# Patient Record
Sex: FEMALE | Race: WHITE | NOT HISPANIC OR LATINO | Employment: FULL TIME | URBAN - METROPOLITAN AREA
[De-identification: names, ages, dates, MRNs, and addresses within clinical notes are randomized per-mention and may not be internally consistent; named-entity substitution may affect disease eponyms.]

---
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
## 2019-03-05 ENCOUNTER — OFFICE VISIT (OUTPATIENT)
Dept: URGENT CARE | Facility: PHYSICIAN GROUP | Age: 19
End: 2019-03-05
Payer: COMMERCIAL

## 2019-03-05 VITALS
OXYGEN SATURATION: 98 % | SYSTOLIC BLOOD PRESSURE: 114 MMHG | BODY MASS INDEX: 21.98 KG/M2 | RESPIRATION RATE: 16 BRPM | TEMPERATURE: 98.6 F | HEIGHT: 68 IN | HEART RATE: 107 BPM | WEIGHT: 145 LBS | DIASTOLIC BLOOD PRESSURE: 70 MMHG

## 2019-03-05 DIAGNOSIS — J11.1 INFLUENZA-LIKE ILLNESS: ICD-10-CM

## 2019-03-05 LAB
FLUAV+FLUBV AG SPEC QL IA: NORMAL
INT CON NEG: NEGATIVE
INT CON NEG: NEGATIVE
INT CON POS: POSITIVE
INT CON POS: POSITIVE
S PYO AG THROAT QL: NORMAL

## 2019-03-05 PROCEDURE — 87804 INFLUENZA ASSAY W/OPTIC: CPT | Performed by: PHYSICIAN ASSISTANT

## 2019-03-05 PROCEDURE — 99214 OFFICE O/P EST MOD 30 MIN: CPT | Performed by: PHYSICIAN ASSISTANT

## 2019-03-05 PROCEDURE — 87880 STREP A ASSAY W/OPTIC: CPT | Performed by: PHYSICIAN ASSISTANT

## 2019-03-05 RX ORDER — OSELTAMIVIR PHOSPHATE 75 MG/1
75 CAPSULE ORAL 2 TIMES DAILY
Qty: 10 CAP | Refills: 0 | Status: SHIPPED | OUTPATIENT
Start: 2019-03-05 | End: 2019-06-26

## 2019-03-05 NOTE — LETTER
March 5, 2019         Patient: Chyna Wade   YOB: 2000   Date of Visit: 3/5/2019           To Whom it May Concern:    Chyna Wade was seen in my clinic on 3/5/2019.   Please excuse her from missed work 03/05/19-03/07/19    If you have any questions or concerns, please don't hesitate to call.        Sincerely,           Laurie Laurent P.A.-C.  Electronically Signed

## 2019-03-05 NOTE — PROGRESS NOTES
Chief Complaint   Patient presents with   • Sore Throat     low grade fever, X last night        HISTORY OF PRESENT ILLNESS: Patient is a 18 y.o. female who presents today for about 24 hours of worsening aches, fevers, sore throat/coughing. Coughing started today. Diffuse body aches.  Took Ibuprofen 2 hours ago, fever was at 100.4.  Mild improvement of symptoms. Cough is mostly dry.  No nausea, vomiting.  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     There are no active problems to display for this patient.      Allergies:Sulfa drugs    Current Outpatient Prescriptions Ordered in [facility]   Medication Sig Dispense Refill   • oseltamivir (TAMIFLU) 75 MG Cap Take 1 Cap by mouth 2 times a day. 10 Cap 0   • ibuprofen (MOTRIN) 800 MG Tab Take 1 Tab by mouth every 8 hours as needed. 60 Tab 0   • buPROPion (WELLBUTRIN XL) 150 MG XL tablet      • albuterol 108 (90 Base) MCG/ACT Aero Soln inhalation aerosol Inhale 2 Puffs by mouth every four hours as needed for Shortness of Breath. 1 Inhaler 0   • lithium CR (LITHOBID) 300 MG Tab CR Take 300 mg by mouth every evening.     • lamotrigine (LAMICTAL) 150 MG tablet Take 300 mg by mouth every morning.     • Montelukast Sodium (SINGULAIR PO) Take 1 Tab by mouth every morning.       No current [facility]-ordered facility-administered medications on file.        Past Medical History:   Diagnosis Date   • Allergy    • Psychiatric disorder     depression, bipolar       Social History   Substance Use Topics   • Smoking status: Never Smoker   • Smokeless tobacco: Never Used   • Alcohol use No       Family Status   Relation Status   • Mo Alive   • Fa Alive   History reviewed. No pertinent family history.    ROS:  Review of Systems   Constitutional: SEE HPI  HENT: SEE HPI  Eyes: Negative for blurred vision.   Respiratory: SEE HPI  Cardiovascular: Negative for chest pain, palpitations, orthopnea and leg swelling.   Gastrointestinal: Negative for heartburn, nausea, vomiting and  "abdominal pain.       Exam:  Blood pressure 114/70, pulse (!) 107, temperature 37 °C (98.6 °F), resp. rate 16, height 1.727 m (5' 8\"), weight 65.8 kg (145 lb), SpO2 98 %, not currently breastfeeding.  General:  Well nourished, well developed female in NAD, mildly unwell appearing.   Eyes: PERRLA, EOM within normal limits, no conjunctival injection, no scleral icterus, visual fields and acuity grossly intact.  Ears: Normal shape and symmetry, no tenderness, no discharge. External canals are without any significant edema or erythema. Tympanic membranes are without any inflammation, no effusion. Gross auditory acuity is intact  Nose: Symmetrical, sinuses without tenderness, clear rhinorrhea.   Mouth: reasonable hygiene, moderate posterior erythema without exudates or tonsillar enlargement.  Neck: no masses, range of motion within normal limits, no tracheal deviation. No lymphadenopathy  Pulmonary: Normal respiratory effort, no wheezes, crackles, or rhonchi.  Cardiovascular: Mildly tachy with reg rhythm without murmurs, rubs, or gallops.  Skin: No visible rashes or lesion. Warm, pink, dry.   Extremities: no clubbing, cyanosis, or edema.  Neuro: A&O x 3. Speech normal/clear.  Normal gait.         Assessment/Plan:  1. Influenza-like illness  oseltamivir (TAMIFLU) 75 MG Cap    POCT Influenza A/B    POCT Rapid Strep A          -strep negative.    -influenza NEG however presenting with influenza like illness.  Lungs CTA.   -fluids emphasized. Alternating Tylenol/Motrin prn pain/inflammation/fever  -rest and supportive care emphasized, Tamiflu rx.   -RTC precautions discussed such as worsening despite treatment or new symptoms/new fevers, etc.   -work note provided    Supportive care, differential diagnoses, and indications for immediate follow-up discussed with patient.   Pathogenesis of diagnosis discussed including typical length and natural progression.   Instructed to return to clinic or nearest emergency department for " any change in condition, further concerns, or worsening of symptoms.  Patient states understanding of the plan of care and discharge instructions.        Laurie Laurent P.A.-C.

## 2019-03-07 ENCOUNTER — OFFICE VISIT (OUTPATIENT)
Dept: URGENT CARE | Facility: PHYSICIAN GROUP | Age: 19
End: 2019-03-07
Payer: COMMERCIAL

## 2019-03-07 VITALS
TEMPERATURE: 98.6 F | HEART RATE: 74 BPM | HEIGHT: 68 IN | DIASTOLIC BLOOD PRESSURE: 70 MMHG | RESPIRATION RATE: 16 BRPM | OXYGEN SATURATION: 98 % | SYSTOLIC BLOOD PRESSURE: 118 MMHG | BODY MASS INDEX: 21.52 KG/M2 | WEIGHT: 142 LBS

## 2019-03-07 DIAGNOSIS — J06.0 SORE THROAT AND LARYNGITIS: ICD-10-CM

## 2019-03-07 DIAGNOSIS — J45.20 MILD INTERMITTENT ASTHMA WITHOUT COMPLICATION: ICD-10-CM

## 2019-03-07 DIAGNOSIS — H65.192 OTHER ACUTE NONSUPPURATIVE OTITIS MEDIA OF LEFT EAR, RECURRENCE NOT SPECIFIED: ICD-10-CM

## 2019-03-07 PROCEDURE — 99213 OFFICE O/P EST LOW 20 MIN: CPT | Performed by: PHYSICIAN ASSISTANT

## 2019-03-07 RX ORDER — AMOXICILLIN 500 MG/1
500 CAPSULE ORAL 2 TIMES DAILY
Qty: 20 CAP | Refills: 0 | Status: SHIPPED | OUTPATIENT
Start: 2019-03-07 | End: 2019-03-17

## 2019-03-07 RX ORDER — ALBUTEROL SULFATE 90 UG/1
2 AEROSOL, METERED RESPIRATORY (INHALATION) EVERY 6 HOURS PRN
Qty: 8.5 G | Refills: 0 | Status: SHIPPED | OUTPATIENT
Start: 2019-03-07 | End: 2020-03-06

## 2019-03-07 ASSESSMENT — ENCOUNTER SYMPTOMS
MYALGIAS: 1
CHILLS: 1
VOMITING: 0
FEVER: 1
SORE THROAT: 1
HEADACHES: 1
NAUSEA: 0
EYE PAIN: 0
SHORTNESS OF BREATH: 1
EYE DISCHARGE: 0
COUGH: 1

## 2019-03-07 NOTE — PROGRESS NOTES
Subjective:      Chyna Wade is a 18 y.o. female who presents with Laryngitis (was seen on 3/5 strep and flu Neg, now throwing up, sinus & chest pressure) and Sore Throat (cant eat or drink )            Pharyngitis    This is a new problem. The current episode started in the past 7 days. The problem has been gradually worsening. Maximum temperature: subjective fever. The pain is moderate. Associated symptoms include congestion, coughing, ear pain, headaches and shortness of breath. Pertinent negatives include no abdominal pain, ear discharge, trouble swallowing or vomiting. She has tried NSAIDs for the symptoms. The treatment provided mild relief.        Patient returns to urgent care today with worsening sore throat.  She was seen 2 days ago and diagnosed with influenza.  She was prescribed Tamiflu at that time.  Patient states her sore throat has gradually worsened. She is experiencing pain with swallowing, and has lost her voice.  Patient also reports bilateral ear pain, left greater than right, and possible fever. She has taken OTC Motrin for her symptoms with mild relief.  Patient states she has a slight headache.  She is also experiencing mild shortness of breath related to her asthma.  She denies nausea/vomiting, nasal/congestion and chest pain.      PMH:  has a past medical history of Allergy and Psychiatric disorder. She also has no past medical history of ASTHMA or Diabetes.  MEDS:   Current Outpatient Prescriptions:   •  amoxicillin (AMOXIL) 500 MG Cap, Take 1 Cap by mouth 2 times a day for 10 days., Disp: 20 Cap, Rfl: 0  •  albuterol 108 (90 Base) MCG/ACT Aero Soln inhalation aerosol, Inhale 2 Puffs by mouth every 6 hours as needed for Shortness of Breath., Disp: 8.5 g, Rfl: 0  •  oseltamivir (TAMIFLU) 75 MG Cap, Take 1 Cap by mouth 2 times a day. (Patient not taking: Reported on 3/7/2019), Disp: 10 Cap, Rfl: 0  •  ibuprofen (MOTRIN) 800 MG Tab, Take 1 Tab by mouth every 8 hours as needed.,  "Disp: 60 Tab, Rfl: 0  •  buPROPion (WELLBUTRIN XL) 150 MG XL tablet, , Disp: , Rfl:   •  albuterol 108 (90 Base) MCG/ACT Aero Soln inhalation aerosol, Inhale 2 Puffs by mouth every four hours as needed for Shortness of Breath., Disp: 1 Inhaler, Rfl: 0  •  lithium CR (LITHOBID) 300 MG Tab CR, Take 300 mg by mouth every evening., Disp: , Rfl:   •  lamotrigine (LAMICTAL) 150 MG tablet, Take 300 mg by mouth every morning., Disp: , Rfl:   •  Montelukast Sodium (SINGULAIR PO), Take 1 Tab by mouth every morning., Disp: , Rfl:   ALLERGIES:   Allergies   Allergen Reactions   • Sulfa Drugs Diarrhea and Nausea     Sick for weeks     SURGHX:   Past Surgical History:   Procedure Laterality Date   • MYRINGOTOMY       SOCHX:  reports that she has never smoked. She has never used smokeless tobacco. She reports that she does not drink alcohol or use drugs.  FH: Family history was reviewed, no pertinent findings to report      Review of Systems   Constitutional: Positive for chills and fever.   HENT: Positive for congestion, ear pain and sore throat. Negative for ear discharge and trouble swallowing.    Eyes: Negative for pain and discharge.   Respiratory: Positive for cough and shortness of breath.    Gastrointestinal: Negative for abdominal pain, nausea and vomiting.   Genitourinary: Negative for dysuria, frequency and urgency.   Musculoskeletal: Positive for myalgias.   Neurological: Positive for headaches.          Objective:     /70   Pulse 74   Temp 37 °C (98.6 °F)   Resp 16   Ht 1.727 m (5' 8\")   Wt 64.4 kg (142 lb)   SpO2 98%   BMI 21.59 kg/m²      Physical Exam   Constitutional: She is oriented to person, place, and time. She appears well-developed and well-nourished. No distress.   HENT:   Head: Normocephalic and atraumatic.   Right Ear: Tympanic membrane, external ear and ear canal normal.   Left Ear: External ear normal. Tympanic membrane is erythematous and bulging.   Mouth/Throat: Posterior oropharyngeal " erythema present. Tonsils are 0 on the right. Tonsils are 0 on the left. No tonsillar exudate.   Cardiovascular: Normal rate and regular rhythm.    Pulmonary/Chest: Effort normal. She has wheezes.   Neurological: She is alert and oriented to person, place, and time.   Skin: Skin is warm and dry.                    Assessment/Plan:      1. Acute Otitis Media, Left  The patient's presenting symptoms and physical exam are consistent with an acute otitis media of her left ear. She is not experiencing any drainage from her left ear, indicating a tympanic membrane rupture is unlikely.   Plan:  Amoxicillin 500 mg PO BID x 10 days  OTC Tylenol or Motrin for fever/discomfort  Work excuse provided  Drink plenty of fluids  Vitamin C for immune health  Return to clinic or go to the ED if symptoms worsen or fail to improve, or if the patient should develop worsening/increasing ear pain, ear discharge, and/or severe fever.     2. Sore Throat (Viral)  The patient's sore throat is likely due to a viral illness given her presenting symptoms and physical exam. Additionally, she tested negative for Strep at her last visit. However, the antibiotic prescribed for her otiits media will also treat a strep infection of her throat.   Plan:   OTC Tylenol or Motrin for fever/discomfort  OTC Supportive Care - warm salt water gargles, sore throat lozenges, warm lemon water, and/or tea.  Work excuse provided  Drink plenty of fluids  Vitamin C for immune health  Return to clinic or go to the ED if symptoms worsen or fail to improve, or if patient develops severe fever, worsening sore throat, change in voice, difficulty handling secretions, and/or shortness of breath.      3. Mild intermittent Asthma   The patient has a history of mild intermittent asthma. She was previously prescribed an Albuterol inhaler to use during exercise. The patient is currently experiencing asthma symptoms in relation to her viral illness. Patient requesting a refill of  her Albuterol inhaler at this time.   Plan  Albuterol Inhaler 2 puffs Q6 hours prn shortness of breath  Return to clinic if symptoms worsen or fail to improve or if the patient should develop severe fever, worsening/increasing shortness of breath, worsening/increasing cough with sputum production, and/or chest pain     Discussed plans with patient, and she agreed to the above.

## 2019-03-07 NOTE — LETTER
[facility] [place]  [address]  [place] [address]     March 7, 2019    Patient: Chyna Wade   YOB: 2000   Date of Visit: 3/7/2019       To Whom It May Concern:    Chyna Wade was seen and treated in our department on 3/7/2019.  Please excuse her from work on 3/8.    Sincerely,       Meryl Mathias

## 2019-03-08 ASSESSMENT — ENCOUNTER SYMPTOMS
ABDOMINAL PAIN: 0
TROUBLE SWALLOWING: 0

## 2019-03-18 ENCOUNTER — APPOINTMENT (RX ONLY)
Dept: URBAN - METROPOLITAN AREA CLINIC 22 | Facility: CLINIC | Age: 19
Setting detail: DERMATOLOGY
End: 2019-03-18

## 2019-03-18 DIAGNOSIS — L81.4 OTHER MELANIN HYPERPIGMENTATION: ICD-10-CM

## 2019-03-18 DIAGNOSIS — D22 MELANOCYTIC NEVI: ICD-10-CM

## 2019-03-18 DIAGNOSIS — Z71.89 OTHER SPECIFIED COUNSELING: ICD-10-CM

## 2019-03-18 PROBLEM — D22.5 MELANOCYTIC NEVI OF TRUNK: Status: ACTIVE | Noted: 2019-03-18

## 2019-03-18 PROBLEM — D48.5 NEOPLASM OF UNCERTAIN BEHAVIOR OF SKIN: Status: ACTIVE | Noted: 2019-03-18

## 2019-03-18 PROCEDURE — 99203 OFFICE O/P NEW LOW 30 MIN: CPT | Mod: 25

## 2019-03-18 PROCEDURE — ? BIOPSY BY SHAVE METHOD

## 2019-03-18 PROCEDURE — 11102 TANGNTL BX SKIN SINGLE LES: CPT

## 2019-03-18 PROCEDURE — ? COUNSELING

## 2019-03-18 ASSESSMENT — LOCATION SIMPLE DESCRIPTION DERM
LOCATION SIMPLE: ABDOMEN
LOCATION SIMPLE: LEFT PLANTAR SURFACE

## 2019-03-18 ASSESSMENT — LOCATION ZONE DERM
LOCATION ZONE: FEET
LOCATION ZONE: TRUNK

## 2019-03-18 ASSESSMENT — LOCATION DETAILED DESCRIPTION DERM
LOCATION DETAILED: LEFT MEDIAL PLANTAR MIDFOOT
LOCATION DETAILED: EPIGASTRIC SKIN

## 2019-03-18 NOTE — PROCEDURE: BIOPSY BY SHAVE METHOD
Anesthesia Type: 1% lidocaine with 1:100,000 epinephrine and a 1:3 solution of 8.4% sodium bicarbonate
Additional Anesthesia Volume In Cc (Will Not Render If 0): 0
Lab Facility: 
Wound Care: Vaseline
Lab: [id number]
Consent: Written consent was obtained and risks were reviewed including but not limited to scarring, infection, bleeding, scabbing, incomplete removal, nerve damage and allergy to anesthesia.
Destruction After The Procedure: No
Cryotherapy Text: The wound bed was treated with cryotherapy after the biopsy was performed.
Depth Of Biopsy: dermis
Type Of Destruction Used: Curettage
Render Post-Care Instructions In Note?: yes
Electrodesiccation Text: The wound bed was treated with electrodesiccation after the biopsy was performed.
Dressing: bandage
Post-Care Instructions: I reviewed with the patient in detail post-care instructions. Patient is to keep the biopsy site dry overnight, and then apply bacitracin twice daily until healed. Patient may apply hydrogen peroxide soaks to remove any crusting.
Biopsy Type: H and E
Electrodesiccation And Curettage Text: The wound bed was treated with electrodesiccation and curettage after the biopsy was performed.
Billing Type: Third-Party Bill
Anesthesia Volume In Cc: 1
Notification Instructions: Patient will be notified of biopsy results. However, patient instructed to call the office if not contacted within 2 weeks.
Curettage Text: The wound bed was treated with curettage after the biopsy was performed.
Silver Nitrate Text: The wound bed was treated with silver nitrate after the biopsy was performed.
Hemostasis: Drysol
Detail Level: Detailed
Biopsy Method: Personna blade

## 2019-06-03 ENCOUNTER — TELEPHONE (OUTPATIENT)
Dept: CARDIOLOGY | Facility: MEDICAL CENTER | Age: 19
End: 2019-06-03

## 2019-06-04 NOTE — TELEPHONE ENCOUNTER
Called by Dr. Herrera at [facility] about this patient in the ER there with symptomatic tachycardia.  VSS at this time.  I discussed trial of metoprolol tartrate 25mg PO bid. Please reach out to her to schedule f/u asap with Dr. Ryder.  It seems this was cancelled for some reason last September, and it also seems she did not get a tilt table test as Dr. Ryder had ordered.      Dr. Ryder would you like her to still complete the tilt table? Maybe we can schedule it before you see her in follow-up.     Thanks!

## 2019-06-06 DIAGNOSIS — R00.2 PALPITATIONS: ICD-10-CM

## 2019-06-06 DIAGNOSIS — R42 DIZZINESS: ICD-10-CM

## 2019-06-06 DIAGNOSIS — R00.0 SYMPTOMATIC TACHYCARDIA: ICD-10-CM

## 2019-06-06 PROBLEM — R55 SYNCOPE AND COLLAPSE: Status: ACTIVE | Noted: 2019-06-06

## 2019-06-06 NOTE — TELEPHONE ENCOUNTER
I ordered the tilt table and am awaiting call back from Chyna.  Dr. Ryder would like the tilt table prior to the appointment.

## 2019-06-07 PROBLEM — R00.2 PALPITATIONS: Status: ACTIVE | Noted: 2019-06-07

## 2019-06-07 PROBLEM — R00.0 SYMPTOMATIC TACHYCARDIA: Status: ACTIVE | Noted: 2019-06-07

## 2019-06-07 PROBLEM — R42 DIZZINESS: Status: ACTIVE | Noted: 2019-06-07

## 2019-06-14 ENCOUNTER — HOSPITAL ENCOUNTER (OUTPATIENT)
Dept: CARDIOLOGY | Facility: MEDICAL CENTER | Age: 19
End: 2019-06-14
Attending: INTERNAL MEDICINE
Payer: COMMERCIAL

## 2019-06-14 DIAGNOSIS — R00.2 PALPITATIONS: ICD-10-CM

## 2019-06-14 DIAGNOSIS — R42 DIZZINESS: ICD-10-CM

## 2019-06-14 DIAGNOSIS — R00.0 SYMPTOMATIC TACHYCARDIA: ICD-10-CM

## 2019-06-14 PROCEDURE — A9270 NON-COVERED ITEM OR SERVICE: HCPCS

## 2019-06-14 PROCEDURE — 93660 TILT TABLE EVALUATION: CPT

## 2019-06-14 PROCEDURE — 700102 HCHG RX REV CODE 250 W/ 637 OVERRIDE(OP)

## 2019-06-14 RX ORDER — NITROGLYCERIN 0.4 MG/1
TABLET SUBLINGUAL
Status: COMPLETED
Start: 2019-06-14 | End: 2019-06-14

## 2019-06-14 RX ADMIN — NITROGLYCERIN 0.4 MG: 0.4 TABLET, ORALLY DISINTEGRATING SUBLINGUAL at 08:16

## 2019-06-14 NOTE — PROGRESS NOTES
"TILT TABLE:  Patient had NITRO TILT TABLE EXAM today.  Patient NPO for Exam, has  home. Patient was POSITIVE for passing out. Consent Signed. PIV initiated. Patient Given Verbal instructions/education regarding exam. Patient Vitals: HR 30s-100s, SBP unobtainable-120s.  Patient had: Projectile vomiting, dilated pupils, and diaphoresis. Patient  self-reported symptoms, nausea, vision loss, and \"not remembering throwing up\". Patient had decrease in heart rate, followed by pauses > 5 secs. Passive Tilt for 20 minutes followed by Nitroglycerin administration sublingual at 20 minute juanito with SBP of 114/75, followed. Patient event at min 26, table lowered and patient vomited, RN supported airway. Patient given 500cc NS bolus per protocol. Patient had 25 min recovery.  RN called Dr. Randall to come to bedside to review EKG tracings, symptoms, and patient condition. Tonia at bedside, and gave verbal instruction to patient and verbal order to discharge.  Patient stated “ I feel ready to go home”.  Patient given verbal discharge instructions per protocol. Patient PIV removed. Patient ambulated self to [facility], escorted by RN, met by  Her  to drive patient home.  , ADR, notified via phone message  And in person regarding EKG tracings and findings.  RN hand delivered EKG tracings to MD, per MD place in box to be read. RN placed EKG tracings and patient documents in box to be read, per MD request.   "

## 2019-06-26 ENCOUNTER — OFFICE VISIT (OUTPATIENT)
Dept: CARDIOLOGY | Facility: MEDICAL CENTER | Age: 19
End: 2019-06-26
Payer: COMMERCIAL

## 2019-06-26 ENCOUNTER — TELEPHONE (OUTPATIENT)
Dept: CARDIOLOGY | Facility: MEDICAL CENTER | Age: 19
End: 2019-06-26

## 2019-06-26 VITALS
SYSTOLIC BLOOD PRESSURE: 100 MMHG | WEIGHT: 149 LBS | OXYGEN SATURATION: 96 % | HEIGHT: 67 IN | DIASTOLIC BLOOD PRESSURE: 68 MMHG | BODY MASS INDEX: 23.39 KG/M2 | HEART RATE: 108 BPM

## 2019-06-26 DIAGNOSIS — R42 DIZZINESS: ICD-10-CM

## 2019-06-26 DIAGNOSIS — R55 SYNCOPE, UNSPECIFIED SYNCOPE TYPE: ICD-10-CM

## 2019-06-26 DIAGNOSIS — I45.5 SINUS PAUSE: ICD-10-CM

## 2019-06-26 PROCEDURE — 99215 OFFICE O/P EST HI 40 MIN: CPT | Performed by: INTERNAL MEDICINE

## 2019-06-26 RX ORDER — CARIPRAZINE 1.5 MG/1
1 CAPSULE, GELATIN COATED ORAL
Refills: 3 | COMMUNITY
Start: 2019-06-04 | End: 2020-03-06

## 2019-06-26 RX ORDER — BENZTROPINE MESYLATE 1 MG/1
TABLET ORAL
Refills: 2 | COMMUNITY
Start: 2019-06-03 | End: 2019-06-26

## 2019-06-26 RX ORDER — LEVONORGESTREL AND ETHINYL ESTRADIOL 150-30(84)
1 KIT ORAL
Refills: 3 | COMMUNITY
Start: 2019-04-10 | End: 2021-08-16

## 2019-06-26 ASSESSMENT — ENCOUNTER SYMPTOMS
ORTHOPNEA: 0
SHORTNESS OF BREATH: 0
ABDOMINAL PAIN: 0
DEPRESSION: 0
PND: 0
PALPITATIONS: 0
DIZZINESS: 1
LOSS OF CONSCIOUSNESS: 0
FALLS: 0

## 2019-06-26 NOTE — TELEPHONE ENCOUNTER
Called and spoke with patient. She will be out of town the later part of July. I offered her an appointment on 7-25 with Dr. Hunt. She will be out of town during this time. That is why the Zio was scheduled on 7-29-19. Patient scheduled to see Dr. Hunt on 8-27-19 so he will have the Zio results for this appointment.

## 2019-06-26 NOTE — PROGRESS NOTES
Chief Complaint   Patient presents with   • Palpitations   • Dizziness       Subjective:   Chyna Wade is a 18-year-old female presenting to clinic for follow-up on dizziness and palpitations.    Patient was last seen in our clinic about a year ago for the above symptoms and a tilt table was ordered but the patient was lost to follow-up.  Earlier this year, patient started having recurrent symptoms.  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  She reports bending over and cleaning the floor while the patient was in the bathroom.  She then remembers having tunnel vision and then remembers waking up while sitting on the floor.  Since then she has had multiple episodes of dizziness and tunnel vision that usually occur when she is standing up or bending over.  She was seen at [employer] [employer] [facility] in early June at which time she was started on low-dose metoprolol for persistent tachycardia.  She was subsequently referred for a tilt table study, detailed below.    She reports drinking about 30 to 36 ounces of water a day.  One caffeinated beverage a day.    Past Medical History:   Diagnosis Date   • Allergy    • Psychiatric disorder     depression, bipolar     Past Surgical History:   Procedure Laterality Date   • MYRINGOTOMY       History reviewed. No pertinent family history.     Social History     Social History   • Marital status: Single     Spouse name: N/A   • Number of children: N/A   • Years of education: N/A     Occupational History   • Not on file.     Social History Main Topics   • Smoking status: Never Smoker   • Smokeless tobacco: Never Used   • Alcohol use No   • Drug use: No   • Sexual activity: No     Other Topics Concern   • Not on file     Social History Narrative   • No narrative on file     Allergies   Allergen Reactions   • Sulfa Drugs Diarrhea and Nausea     Sick for weeks     Outpatient Encounter Prescriptions as of  "6/26/2019   Medication Sig Dispense Refill   • DAYSEE 0.15-0.03 &0.01 MG Tab Take 1 Tab by mouth every day.  3   • VRAYLAR 1.5 MG Cap Take 1 Cap by mouth every day.  3   • lamotrigine (LAMICTAL) 150 MG tablet Take 300 mg by mouth every morning.     • [DISCONTINUED] metoprolol (LOPRESSOR) 25 MG Tab TAKE 1 TABLET BY MOUTH TWICE A DAY  0   • [DISCONTINUED] benztropine (COGENTIN) 1 MG Tab TAKE 1 TABLET ORALLY DAILY IN THE MORNING  2   • albuterol 108 (90 Base) MCG/ACT Aero Soln inhalation aerosol Inhale 2 Puffs by mouth every 6 hours as needed for Shortness of Breath. 8.5 g 0   • [DISCONTINUED] oseltamivir (TAMIFLU) 75 MG Cap Take 1 Cap by mouth 2 times a day. (Patient not taking: Reported on 3/7/2019) 10 Cap 0   • ibuprofen (MOTRIN) 800 MG Tab Take 1 Tab by mouth every 8 hours as needed. 60 Tab 0   • [DISCONTINUED] buPROPion (WELLBUTRIN XL) 150 MG XL tablet      • albuterol 108 (90 Base) MCG/ACT Aero Soln inhalation aerosol Inhale 2 Puffs by mouth every four hours as needed for Shortness of Breath. 1 Inhaler 0   • [DISCONTINUED] lithium CR (LITHOBID) 300 MG Tab CR Take 300 mg by mouth every evening.     • [DISCONTINUED] Montelukast Sodium (SINGULAIR PO) Take 1 Tab by mouth every morning.       No facility-administered encounter medications on file as of 6/26/2019.      Review of Systems   Constitutional: Negative for malaise/fatigue.   Respiratory: Negative for shortness of breath.    Cardiovascular: Negative for chest pain, palpitations, orthopnea, leg swelling and PND.   Gastrointestinal: Negative for abdominal pain.   Musculoskeletal: Negative for falls.   Neurological: Positive for dizziness. Negative for loss of consciousness.   Psychiatric/Behavioral: Negative for depression.   All other systems reviewed and are negative.       Objective:   /62 (BP Location: Left arm, Patient Position: Sitting, BP Cuff Size: Adult)   Pulse 90   Ht 1.702 m (5' 7\")   Wt 67.6 kg (149 lb)   SpO2 95%   BMI 23.34 kg/m² "     Physical Exam   Constitutional: She is oriented to person, place, and time. She appears well-developed and well-nourished. No distress.   HENT:   Head: Normocephalic and atraumatic.   Eyes: Conjunctivae are normal. No scleral icterus.   Neck: Normal range of motion. Neck supple.   Cardiovascular: Normal rate, regular rhythm and normal heart sounds.  Exam reveals no gallop and no friction rub.    No murmur heard.  Pulmonary/Chest: Effort normal and breath sounds normal. No respiratory distress. She has no wheezes. She has no rales.   Abdominal: Soft. She exhibits no distension. There is no tenderness.   Musculoskeletal: She exhibits no edema.   Neurological: She is alert and oriented to person, place, and time.   Skin: Skin is warm and dry. She is not diaphoretic.   Psychiatric: She has a normal mood and affect. Her behavior is normal.   Nursing note and vitals reviewed.    Holter monitor performed in May 2018 showed sinus arrhythmia with an average heart rate of 79 bpm.  No sustained arrhythmias noted.  No symptoms reported.  Sinus tachycardia for approximately 7 hours in the 48 hour.    Echocardiogram performed in May 2018 showed normal LV systolic function.  No major valvular pathology noted.    Tilt table test performed June 2019 was personally reviewed and per my interpretation showed significant bradycardia and hypotension post nitroglycerin.  Patient had a 5-second pause as well.    Assessment:     1. Syncope, unspecified syncope type  REFERRAL TO CARDIAC ELECTROPHYSIOLOGY   2. Dizziness  REFERRAL TO CARDIAC ELECTROPHYSIOLOGY    Holter Monitor / Event Recorder   3. Sinus pause  REFERRAL TO CARDIAC ELECTROPHYSIOLOGY    Holter Monitor / Event Recorder       Medical Decision Making:  Today's Assessment / Status / Plan:     Palpitations:  Dizziness:  Sinus pause:  Patient developed hypotension and bradycardia during her tilt table study suggesting vasovagal syncope.  She also had a 5-second sinus pause during  her tilt table study.   I did orthostatic vitals on her today which were grossly positive.    Patient's pause during the tilt is concerning.  Based on my review of the literature, since this episode of pause occurred during a tilt table test showing vasovagal, she likely does not meet indication for permanent pacemaker.  Nonetheless I will refer her to electrophysiology to get their opinion regarding possible permanent pacing.  I have ordered a Ziopatch monitor to evaluate for any recurrent bradyarrhythmias or pauses.  If she does have significant arrhythmias on her ambulatory EKG monitor, she would meet indication for a permanent pacemaker.  She was on metoprolol at the time of return to the study.  I have discontinued her metoprolol for now.    In the meantime patient has been given written instructions to increase her fluid intake to at least 64 to 80 ounces of fluid a day and her salt intake to at least 6 g a day.  Lifestyle changes have been discussed including standing up slowly and to squat should she have symptoms.  She should minimize her caffeine intake.  She will return for repeat orthostatics in 2 weeks.    Total 40 minutes face-to-face time spent with patient, with greater than 50% of the total time discussing patient's issues and symptoms as listed above in assessment and plan, as well as managing coordination of care for future evaluation and treatment. Most of the time was spent discussing prior testing results and plan along with lifestyle modifications as discussed above.     Return to clinic in 4 months or earlier if needed.    Thank you for allowing me to participate in the care of this patient. Please do not hesitate to contact me with any questions.    Maria Ines Ryder MD  Cardiologist  [facility]      PLEASE NOTE: This dictation was created using voice recognition software.

## 2019-06-26 NOTE — LETTER
[facility]   [address] [place] [address]  [place] [address]  Phone: [phone]  Fax: [phone]              Chyna Bond  2000    Encounter Date: 6/26/2019    Maria Ines Ryder M.D.          PROGRESS NOTE:  Chief Complaint   Patient presents with   • Palpitations   • Dizziness       Subjective:   Chyna Wade is a 18-year-old female presenting to clinic for follow-up on dizziness and palpitations.    Patient was last seen in our clinic about a year ago for the above symptoms and a tilt table was ordered but the patient was lost to follow-up.  Earlier this year, patient started having recurrent symptoms.  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  She reports bending over and cleaning the floor while the patient was in the bathroom.  She then remembers having tunnel vision and then remembers waking up while sitting on the floor.  Since then she has had multiple episodes of dizziness and tunnel vision that usually occur when she is standing up or bending over.  She was seen at [facility] in early June at which time she was started on low-dose metoprolol for persistent tachycardia.  She was subsequently referred for a tilt table study, detailed below.    She reports drinking about 30 to 36 ounces of water a day.  One caffeinated beverage a day.    Past Medical History:   Diagnosis Date   • Allergy    • Psychiatric disorder     depression, bipolar     Past Surgical History:   Procedure Laterality Date   • MYRINGOTOMY       History reviewed. No pertinent family history.     Social History     Social History   • Marital status: Single     Spouse name: N/A   • Number of children: N/A   • Years of education: N/A     Occupational History   • Not on file.     Social History Main Topics   • Smoking status: Never Smoker   • Smokeless tobacco: Never Used   • Alcohol use No   •  Drug use: No   • Sexual activity: No     Other Topics Concern   • Not on file     Social History Narrative   • No narrative on file     Allergies   Allergen Reactions   • Sulfa Drugs Diarrhea and Nausea     Sick for weeks     Outpatient Encounter Prescriptions as of 6/26/2019   Medication Sig Dispense Refill   • DAYSEE 0.15-0.03 &0.01 MG Tab Take 1 Tab by mouth every day.  3   • VRAYLAR 1.5 MG Cap Take 1 Cap by mouth every day.  3   • lamotrigine (LAMICTAL) 150 MG tablet Take 300 mg by mouth every morning.     • [DISCONTINUED] metoprolol (LOPRESSOR) 25 MG Tab TAKE 1 TABLET BY MOUTH TWICE A DAY  0   • [DISCONTINUED] benztropine (COGENTIN) 1 MG Tab TAKE 1 TABLET ORALLY DAILY IN THE MORNING  2   • albuterol 108 (90 Base) MCG/ACT Aero Soln inhalation aerosol Inhale 2 Puffs by mouth every 6 hours as needed for Shortness of Breath. 8.5 g 0   • [DISCONTINUED] oseltamivir (TAMIFLU) 75 MG Cap Take 1 Cap by mouth 2 times a day. (Patient not taking: Reported on 3/7/2019) 10 Cap 0   • ibuprofen (MOTRIN) 800 MG Tab Take 1 Tab by mouth every 8 hours as needed. 60 Tab 0   • [DISCONTINUED] buPROPion (WELLBUTRIN XL) 150 MG XL tablet      • albuterol 108 (90 Base) MCG/ACT Aero Soln inhalation aerosol Inhale 2 Puffs by mouth every four hours as needed for Shortness of Breath. 1 Inhaler 0   • [DISCONTINUED] lithium CR (LITHOBID) 300 MG Tab CR Take 300 mg by mouth every evening.     • [DISCONTINUED] Montelukast Sodium (SINGULAIR PO) Take 1 Tab by mouth every morning.       No facility-administered encounter medications on file as of 6/26/2019.      Review of Systems   Constitutional: Negative for malaise/fatigue.   Respiratory: Negative for shortness of breath.    Cardiovascular: Negative for chest pain, palpitations, orthopnea, leg swelling and PND.   Gastrointestinal: Negative for abdominal pain.   Musculoskeletal: Negative for falls.   Neurological: Positive for dizziness. Negative for loss of consciousness.   Psychiatric/Behavioral:  "Negative for depression.   All other systems reviewed and are negative.       Objective:   /62 (BP Location: Left arm, Patient Position: Sitting, BP Cuff Size: Adult)   Pulse 90   Ht 1.702 m (5' 7\")   Wt 67.6 kg (149 lb)   SpO2 95%   BMI 23.34 kg/m²      Physical Exam   Constitutional: She is oriented to person, place, and time. She appears well-developed and well-nourished. No distress.   HENT:   Head: Normocephalic and atraumatic.   Eyes: Conjunctivae are normal. No scleral icterus.   Neck: Normal range of motion. Neck supple.   Cardiovascular: Normal rate, regular rhythm and normal heart sounds.  Exam reveals no gallop and no friction rub.    No murmur heard.  Pulmonary/Chest: Effort normal and breath sounds normal. No respiratory distress. She has no wheezes. She has no rales.   Abdominal: Soft. She exhibits no distension. There is no tenderness.   Musculoskeletal: She exhibits no edema.   Neurological: She is alert and oriented to person, place, and time.   Skin: Skin is warm and dry. She is not diaphoretic.   Psychiatric: She has a normal mood and affect. Her behavior is normal.   Nursing note and vitals reviewed.    Holter monitor performed in May 2018 showed sinus arrhythmia with an average heart rate of 79 bpm.  No sustained arrhythmias noted.  No symptoms reported.  Sinus tachycardia for approximately 7 hours in the 48 hour.    Echocardiogram performed in May 2018 showed normal LV systolic function.  No major valvular pathology noted.    Tilt table test performed June 2019 was personally reviewed and per my interpretation showed significant bradycardia and hypotension post nitroglycerin.  Patient had a 5-second pause as well.    Assessment:     1. Syncope, unspecified syncope type  REFERRAL TO CARDIAC ELECTROPHYSIOLOGY   2. Dizziness  REFERRAL TO CARDIAC ELECTROPHYSIOLOGY    Holter Monitor / Event Recorder   3. Sinus pause  REFERRAL TO CARDIAC ELECTROPHYSIOLOGY    Holter Monitor / Event Recorder "       Medical Decision Making:  Today's Assessment / Status / Plan:     Palpitations:  Dizziness:  Sinus pause:  Patient developed hypotension and bradycardia during her tilt table study suggesting vasovagal syncope.  She also had a 5-second sinus pause during her tilt table study.   I did orthostatic vitals on her today which were grossly positive.    Patient's pause during the tilt is concerning.  Based on my review of the literature, since this episode of pause occurred during a tilt table test showing vasovagal, she likely does not meet indication for permanent pacemaker.  Nonetheless I will refer her to electrophysiology to get their opinion regarding possible permanent pacing.  I have ordered a Ziopatch monitor to evaluate for any recurrent bradyarrhythmias or pauses.  If she does have significant arrhythmias on her ambulatory EKG monitor, she would meet indication for a permanent pacemaker.  She was on metoprolol at the time of return to the study.  I have discontinued her metoprolol for now.    In the meantime patient has been given written instructions to increase her fluid intake to at least 64 to 80 ounces of fluid a day and her salt intake to at least 6 g a day.  Lifestyle changes have been discussed including standing up slowly and to squat should she have symptoms.  She should minimize her caffeine intake.  She will return for repeat orthostatics in 2 weeks.    Total 40 minutes face-to-face time spent with patient, with greater than 50% of the total time discussing patient's issues and symptoms as listed above in assessment and plan, as well as managing coordination of care for future evaluation and treatment. Most of the time was spent discussing prior testing results and plan along with lifestyle modifications as discussed above.     Return to clinic in 4 months or earlier if needed.    Thank you for allowing me to participate in the care of this patient. Please do not hesitate to contact me with any  questions.    Maria Ines Ryder MD  Cardiologist  [facility]      PLEASE NOTE: This dictation was created using voice recognition software.               Linda Collazo M.D.  [address] [address]  VIA Facsimile: [phone]

## 2019-06-26 NOTE — PATIENT INSTRUCTIONS
Please increase her fluid intake.  He should be drinking at least 64 to 80 ounces of fluid a day, if not more.  Please minimize your caffeine intake.  Please increase your salt intake.  You should be getting at least 6 g of salt a day, if not more.  Please stop taking metoprolol.  Remember to stand up slowly and to squat if you have symptoms.

## 2019-06-26 NOTE — TELEPHONE ENCOUNTER
----- Message from Tonny Davis R.N. sent at 6/26/2019  1:28 PM PDT -----  Dr. Ryder put in an EP consult for this pt for next available.

## 2019-07-29 ENCOUNTER — TELEPHONE (OUTPATIENT)
Dept: CARDIOLOGY | Facility: MEDICAL CENTER | Age: 19
End: 2019-07-29

## 2019-07-29 ENCOUNTER — NON-PROVIDER VISIT (OUTPATIENT)
Dept: CARDIOLOGY | Facility: MEDICAL CENTER | Age: 19
End: 2019-07-29
Payer: COMMERCIAL

## 2019-07-29 DIAGNOSIS — R42 DIZZINESS: ICD-10-CM

## 2019-07-29 DIAGNOSIS — R00.0 SINUS TACHYCARDIA: ICD-10-CM

## 2019-07-29 DIAGNOSIS — I45.5 SINUS PAUSE: ICD-10-CM

## 2019-07-29 NOTE — PROCEDURES
"The patient is a 18-year-old female with past medical history of syncope.     TECHNIQUE: Risks and benefits explained to the patient. Consent obtained.  Patient Vitals: HR 30s-100s, SBP unobtainable-120s.  Patient had: Projectile vomiting, dilated pupils, and diaphoresis. Patient  self-reported symptoms, nausea, vision loss, and \"not remembering throwing up\". Patient had decrease in heart rate, followed by pauses > 5 secs. Passive Tilt for 20 minutes followed by Nitroglycerin administration sublingual at 20 minute juanito with SBP of 114/75, followed. Patient event at min 26, table lowered and patient vomited, RN supported airway. Patient given 500cc NS bolus per protocol. Patient had 25 min recovery.  R    COMPLICATIONS: None.    Tilt table was then terminated.    SUMMARY: Positive tilt table for vasovagal syncope with significant increase of heart rate with decrease of blood pressure.    RECOMMENDATIONS: I recommend followup in the office in one week.      "

## 2019-08-07 ENCOUNTER — EMPLOYEE HEALTH (OUTPATIENT)
Dept: OCCUPATIONAL MEDICINE | Facility: CLINIC | Age: 19
End: 2019-08-07

## 2019-08-07 ENCOUNTER — EH NON-PROVIDER (OUTPATIENT)
Dept: OCCUPATIONAL MEDICINE | Facility: CLINIC | Age: 19
End: 2019-08-07

## 2019-08-07 ENCOUNTER — HOSPITAL ENCOUNTER (OUTPATIENT)
Facility: MEDICAL CENTER | Age: 19
End: 2019-08-07
Attending: PREVENTIVE MEDICINE
Payer: COMMERCIAL

## 2019-08-07 VITALS
HEIGHT: 67 IN | WEIGHT: 166 LBS | BODY MASS INDEX: 26.06 KG/M2 | TEMPERATURE: 98 F | RESPIRATION RATE: 16 BRPM | HEART RATE: 100 BPM | DIASTOLIC BLOOD PRESSURE: 96 MMHG | SYSTOLIC BLOOD PRESSURE: 132 MMHG | OXYGEN SATURATION: 97 %

## 2019-08-07 DIAGNOSIS — Z02.1 PRE-EMPLOYMENT DRUG SCREENING: ICD-10-CM

## 2019-08-07 DIAGNOSIS — Z02.1 PRE-EMPLOYMENT HEALTH SCREENING EXAMINATION: ICD-10-CM

## 2019-08-07 DIAGNOSIS — Z02.89 ENCOUNTER FOR OCCUPATIONAL HEALTH ASSESSMENT: ICD-10-CM

## 2019-08-07 PROCEDURE — 8915 PR COMPREHENSIVE PHYSICAL: Performed by: NURSE PRACTITIONER

## 2019-08-07 PROCEDURE — 94010 BREATHING CAPACITY TEST: CPT | Performed by: PREVENTIVE MEDICINE

## 2019-08-07 PROCEDURE — 94375 RESPIRATORY FLOW VOLUME LOOP: CPT | Performed by: PREVENTIVE MEDICINE

## 2019-08-07 PROCEDURE — 80305 DRUG TEST PRSMV DIR OPT OBS: CPT | Performed by: PREVENTIVE MEDICINE

## 2019-08-07 PROCEDURE — 86480 TB TEST CELL IMMUN MEASURE: CPT | Performed by: PREVENTIVE MEDICINE

## 2019-08-07 NOTE — PROGRESS NOTES
Preemployment physical completed.  See scanned document.  Patient wearing Holter monitor during visit.  States is going to be removed soon.  No adverse effects at this time.

## 2019-08-08 ENCOUNTER — OFFICE VISIT (OUTPATIENT)
Dept: URGENT CARE | Facility: PHYSICIAN GROUP | Age: 19
End: 2019-08-08
Payer: COMMERCIAL

## 2019-08-08 VITALS
WEIGHT: 161 LBS | TEMPERATURE: 98.6 F | RESPIRATION RATE: 16 BRPM | HEIGHT: 67 IN | DIASTOLIC BLOOD PRESSURE: 72 MMHG | BODY MASS INDEX: 25.27 KG/M2 | SYSTOLIC BLOOD PRESSURE: 108 MMHG | OXYGEN SATURATION: 98 % | HEART RATE: 84 BPM

## 2019-08-08 DIAGNOSIS — K52.9 AGE (ACUTE GASTROENTERITIS): ICD-10-CM

## 2019-08-08 LAB
INT CON NEG: NEGATIVE
INT CON POS: POSITIVE
POC URINE PREGNANCY TEST: NORMAL

## 2019-08-08 PROCEDURE — 81025 URINE PREGNANCY TEST: CPT | Performed by: PHYSICIAN ASSISTANT

## 2019-08-08 PROCEDURE — 99214 OFFICE O/P EST MOD 30 MIN: CPT | Performed by: PHYSICIAN ASSISTANT

## 2019-08-08 RX ORDER — ONDANSETRON 4 MG/1
4 TABLET, ORALLY DISINTEGRATING ORAL EVERY 8 HOURS PRN
Qty: 10 TAB | Refills: 0 | Status: SHIPPED | OUTPATIENT
Start: 2019-08-08 | End: 2019-08-27

## 2019-08-08 RX ORDER — ONDANSETRON 4 MG/1
4 TABLET, ORALLY DISINTEGRATING ORAL ONCE
Status: COMPLETED | OUTPATIENT
Start: 2019-08-08 | End: 2019-08-08

## 2019-08-08 RX ADMIN — ONDANSETRON 4 MG: 4 TABLET, ORALLY DISINTEGRATING ORAL at 12:23

## 2019-08-08 NOTE — PROGRESS NOTES
Chief Complaint   Patient presents with   • Emesis     Diarrhea x 2 days.  Vomitting since this am.       HISTORY OF PRESENT ILLNESS: Patient is a 18 y.o. female who presents today for about 2 days of not improving diarrhea and vomiting and starting today.   She is having waxing and waning abdominal cramping without focal pain.  No fevers or chills.   No other sick contacts.  No recent foreign travel or abx.  She did try some Imodium without relief.      She states she is most concerned because she has hx of orthostatic hypotension and symptomatic tachycardia, has been told by her Cardiologist to maintain adequate sodium intake and she has been unable to do with her illness.    LMP (current)    Patient Active Problem List    Diagnosis Date Noted   • Symptomatic tachycardia 06/07/2019   • Dizziness 06/07/2019   • Palpitations 06/07/2019       Allergies:Nitroglycerin and Sulfa drugs    Current Outpatient Medications Ordered in Epic   Medication Sig Dispense Refill   • Montelukast Sodium (SINGULAIR PO) Take  by mouth.     • DAYSEE 0.15-0.03 &0.01 MG Tab Take 1 Tab by mouth every day.  3   • VRAYLAR 1.5 MG Cap Take 1 Cap by mouth every day.  3   • lamotrigine (LAMICTAL) 150 MG tablet Take 300 mg by mouth every morning.     • albuterol 108 (90 Base) MCG/ACT Aero Soln inhalation aerosol Inhale 2 Puffs by mouth every 6 hours as needed for Shortness of Breath. 8.5 g 0   • ibuprofen (MOTRIN) 800 MG Tab Take 1 Tab by mouth every 8 hours as needed. 60 Tab 0   • albuterol 108 (90 Base) MCG/ACT Aero Soln inhalation aerosol Inhale 2 Puffs by mouth every four hours as needed for Shortness of Breath. 1 Inhaler 0     No current Epic-ordered facility-administered medications on file.        Past Medical History:   Diagnosis Date   • Allergy    • Psychiatric disorder     depression, bipolar       Social History     Tobacco Use   • Smoking status: Never Smoker   • Smokeless tobacco: Never Used   Substance Use Topics   • Alcohol use: No  "  • Drug use: No       Family Status   Relation Name Status   • Mo  Alive   • Fa  Alive   No family history on file.    ROS:  Review of Systems   Constitutional: SEE HPI  HENT: Negative for ear pain, nosebleeds, congestion, sore throat and neck pain.    Eyes: Negative for blurred vision.   Respiratory: Negative for cough, sputum production, shortness of breath and wheezing.    Cardiovascular: Negative for chest pain, palpitations, orthopnea and leg swelling.   Gastrointestinal:SEE HPI  Genitourinary: Negative for dysuria, urgency and frequency.     Exam:  /72   Pulse 84   Temp 37 °C (98.6 °F) (Temporal)   Resp 16   Ht 1.702 m (5' 7\")   Wt 73 kg (161 lb)   SpO2 98%   General:  Well nourished, well developed female in NAD  Eyes: PERRLA, EOM within normal limits, no conjunctival injection, no scleral icterus, visual fields and acuity grossly intact.  Mouth: reasonable hygiene, no erythema exudates or tonsillar enlargement.  Neck: no masses, range of motion within normal limits, no tracheal deviation. No lymphadenopathy  Pulmonary: Normal respiratory effort, no wheezes, crackles, or rhonchi.  Cardiovascular: regular rate and rhythm without murmurs, rubs, or gallops.  Abdomen: Soft, mild diffuse TTP, nondistended. Normal bowel sounds. No hepatosplenomegaly or masses, or hernias. No rebound or guarding.    Skin: No visible rashes or lesion. Warm, pink, dry.   Extremities: no clubbing, cyanosis, or edema.  Neuro: A&O x 3. Speech normal/clear.  Normal gait.         Assessment/Plan:  1. AGE (acute gastroenteritis)  ondansetron (ZOFRAN ODT) dispertab 4 mg    ondansetron (ZOFRAN ODT) 4 MG TABLET DISPERSIBLE    POCT Pregnancy         -consistent with self limited viral gastroenteritis.   -benign abdominal exam, no evidence of acute abdomen  -zofran tablet given in clinic, additional sent in.   -electrolyte rehydration, advance bland diet as tolerated.   -abdominal pain red flags discussed, ER precautions. "       Supportive care, differential diagnoses, and indications for immediate follow-up discussed with patient.   Pathogenesis of diagnosis discussed including typical length and natural progression.   Instructed to return to clinic or nearest emergency department for any change in condition, further concerns, or worsening of symptoms.  Patient states understanding of the plan of care and discharge instructions.        Laurie Laurent P.A.-C.

## 2019-08-08 NOTE — LETTER
August 8, 2019         Patient: Chyna Bond   YOB: 2000   Date of Visit: 8/8/2019           To Whom it May Concern:    Chyna Bond was seen in my clinic on 8/8/2019. Please excuse her from work tonight.     If you have any questions or concerns, please don't hesitate to call.        Sincerely,           Laurie Laurent P.A.-C.  Electronically Signed

## 2019-08-09 LAB
GAMMA INTERFERON BACKGROUND BLD IA-ACNC: 0.02 IU/ML
M TB IFN-G BLD-IMP: NEGATIVE
M TB IFN-G CD4+ BCKGRND COR BLD-ACNC: 0 IU/ML
MITOGEN IGNF BCKGRD COR BLD-ACNC: >10 IU/ML
QFT TB2 - NIL TBQ2: 0 IU/ML

## 2019-08-12 ENCOUNTER — EH NON-PROVIDER (OUTPATIENT)
Dept: OCCUPATIONAL MEDICINE | Facility: CLINIC | Age: 19
End: 2019-08-12

## 2019-08-12 DIAGNOSIS — Z71.85 IMMUNIZATION COUNSELING: ICD-10-CM

## 2019-08-23 ENCOUNTER — TELEPHONE (OUTPATIENT)
Dept: CARDIOLOGY | Facility: MEDICAL CENTER | Age: 19
End: 2019-08-23

## 2019-08-27 ENCOUNTER — TELEPHONE (OUTPATIENT)
Dept: CARDIOLOGY | Facility: MEDICAL CENTER | Age: 19
End: 2019-08-27

## 2019-08-27 ENCOUNTER — OFFICE VISIT (OUTPATIENT)
Dept: CARDIOLOGY | Facility: MEDICAL CENTER | Age: 19
End: 2019-08-27

## 2019-08-27 VITALS
HEIGHT: 68 IN | BODY MASS INDEX: 25.31 KG/M2 | HEART RATE: 85 BPM | OXYGEN SATURATION: 97 % | DIASTOLIC BLOOD PRESSURE: 72 MMHG | WEIGHT: 167 LBS | SYSTOLIC BLOOD PRESSURE: 110 MMHG

## 2019-08-27 DIAGNOSIS — R00.2 PALPITATIONS: ICD-10-CM

## 2019-08-27 DIAGNOSIS — R00.0 TACHYCARDIA: ICD-10-CM

## 2019-08-27 DIAGNOSIS — I45.5 SINUS PAUSE: ICD-10-CM

## 2019-08-27 DIAGNOSIS — R42 DIZZINESS: ICD-10-CM

## 2019-08-27 DIAGNOSIS — R55 VASOVAGAL SYNCOPE: ICD-10-CM

## 2019-08-27 DIAGNOSIS — G90.A POTS (POSTURAL ORTHOSTATIC TACHYCARDIA SYNDROME): ICD-10-CM

## 2019-08-27 LAB — EKG IMPRESSION: NORMAL

## 2019-08-27 PROCEDURE — 99244 OFF/OP CNSLTJ NEW/EST MOD 40: CPT | Performed by: INTERNAL MEDICINE

## 2019-08-27 PROCEDURE — 93000 ELECTROCARDIOGRAM COMPLETE: CPT | Performed by: INTERNAL MEDICINE

## 2019-08-27 ASSESSMENT — ENCOUNTER SYMPTOMS
DEPRESSION: 0
VOMITING: 0
CHILLS: 0
BLURRED VISION: 0
FEVER: 0
NAUSEA: 0
EYE PAIN: 0
PALPITATIONS: 1
HEMOPTYSIS: 0
NERVOUS/ANXIOUS: 0
EYE DISCHARGE: 0
BRUISES/BLEEDS EASILY: 0
SPEECH CHANGE: 0
ABDOMINAL PAIN: 0
MYALGIAS: 0
LOSS OF CONSCIOUSNESS: 0
COUGH: 0
WHEEZING: 0

## 2019-08-27 NOTE — TELEPHONE ENCOUNTER
Left message for patient that she left her small black wallet in exam room #13 after visit with  8/27/19.  Let Koby at [facility] know That I called patient.

## 2019-08-27 NOTE — PROGRESS NOTES
Chief Complaint   Patient presents with   • Tachycardia     PP DX:TACHYCARDIA/SINUS PAUSE   • Syncope       Subjective:   Chyna Bond is a 18 y.o. female who presents today being seen in consult on request of Dr. Ryder secondary to possible vasovagal syncope with positive head upright tilt table test.  Also possible POTS syndrome.  Patient's notes that her heart goes a little bit fast when she is standing. Always occurs in the upright position has not had a recent syncope may be had one episode of syncope in the past.  Positive had tried for a tilt table test with nitroglycerin with mixed response.  Beta-blockers were stopped.  On hydration.  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  No family history of heart disease.  Normal echocardiogram normal Holter monitor.  Patient is accompanied by her boyfriend and her mother.    Past Medical History:   Diagnosis Date   • Allergy    • Psychiatric disorder     depression, bipolar     Past Surgical History:   Procedure Laterality Date   • MYRINGOTOMY       No family history on file.  Social History     Socioeconomic History   • Marital status: Single     Spouse name: Not on file   • Number of children: Not on file   • Years of education: Not on file   • Highest education level: Not on file   Occupational History   • Not on file   Social Needs   • Financial resource strain: Not on file   • Food insecurity:     Worry: Not on file     Inability: Not on file   • Transportation needs:     Medical: Not on file     Non-medical: Not on file   Tobacco Use   • Smoking status: Never Smoker   • Smokeless tobacco: Never Used   Substance and Sexual Activity   • Alcohol use: No   • Drug use: No   • Sexual activity: Never   Lifestyle   • Physical activity:     Days per week: Not on file     Minutes per session: Not on file   • Stress: Not on file   Relationships   • Social connections:     Talks on phone: Not on file     Gets together: Not on file     Attends Nondenominational service: Not on file      Active member of club or organization: Not on file     Attends meetings of clubs or organizations: Not on file     Relationship status: Not on file   • Intimate partner violence:     Fear of current or ex partner: Not on file     Emotionally abused: Not on file     Physically abused: Not on file     Forced sexual activity: Not on file   Other Topics Concern   • Behavioral problems Not Asked   • Interpersonal relationships Not Asked   • Sad or not enjoying activities Not Asked   • Suicidal thoughts Not Asked   • Poor school performance Not Asked   • Reading difficulties Not Asked   • Speech difficulties Not Asked   • Writing difficulties Not Asked   • Inadequate sleep Not Asked   • Excessive TV viewing Not Asked   • Excessive video game use Not Asked   • Inadequate exercise Not Asked   • Sports related Not Asked   • Poor diet Not Asked   • Family concerns for drug/alcohol abuse Not Asked   • Poor oral hygiene Not Asked   • Bike safety Not Asked   • Family concerns vehicle safety Not Asked   Social History Narrative   • Not on file     Allergies   Allergen Reactions   • Nitroglycerin    • Sulfa Drugs Diarrhea and Nausea     Sick for weeks     Outpatient Encounter Medications as of 8/27/2019   Medication Sig Dispense Refill   • Montelukast Sodium (SINGULAIR PO) Take  by mouth.     • DAYSEE 0.15-0.03 &0.01 MG Tab Take 1 Tab by mouth every day.  3   • VRAYLAR 1.5 MG Cap Take 1 Cap by mouth every day.  3   • ibuprofen (MOTRIN) 800 MG Tab Take 1 Tab by mouth every 8 hours as needed. 60 Tab 0   • albuterol 108 (90 Base) MCG/ACT Aero Soln inhalation aerosol Inhale 2 Puffs by mouth every four hours as needed for Shortness of Breath. 1 Inhaler 0   • lamotrigine (LAMICTAL) 150 MG tablet Take 300 mg by mouth every morning.     • [DISCONTINUED] ondansetron (ZOFRAN ODT) 4 MG TABLET DISPERSIBLE Take 1 Tab by mouth every 8 hours as needed. (Patient not taking: Reported on 8/27/2019) 10 Tab 0   • albuterol 108 (90 Base) MCG/ACT  "Aero Soln inhalation aerosol Inhale 2 Puffs by mouth every 6 hours as needed for Shortness of Breath. 8.5 g 0     No facility-administered encounter medications on file as of 8/27/2019.      Review of Systems   Constitutional: Negative for chills and fever.   HENT: Negative for congestion.    Eyes: Negative for blurred vision, pain and discharge.   Respiratory: Negative for cough, hemoptysis and wheezing.    Cardiovascular: Positive for palpitations. Negative for chest pain.   Gastrointestinal: Negative for abdominal pain, nausea and vomiting.   Musculoskeletal: Negative for joint pain and myalgias.   Skin: Negative for itching and rash.   Neurological: Negative for speech change and loss of consciousness.   Endo/Heme/Allergies: Does not bruise/bleed easily.   Psychiatric/Behavioral: Negative for depression. The patient is not nervous/anxious.    All other systems reviewed and are negative.       Objective:   /72 (BP Location: Left arm, Patient Position: Sitting, BP Cuff Size: Adult)   Pulse 85   Ht 1.727 m (5' 8\")   Wt 75.8 kg (167 lb)   LMP 08/08/2019 (Exact Date)   SpO2 97%   BMI 25.39 kg/m²     Physical Exam   Constitutional: She is oriented to person, place, and time. She appears well-developed and well-nourished.   HENT:   Head: Normocephalic and atraumatic.   Eyes: Pupils are equal, round, and reactive to light. EOM are normal.   Neck: Normal range of motion. Neck supple.   Cardiovascular: Normal rate, regular rhythm, normal heart sounds and intact distal pulses. Exam reveals no gallop and no friction rub.   No murmur heard.  Pulmonary/Chest: Effort normal and breath sounds normal.   Abdominal: Soft. Bowel sounds are normal.   Musculoskeletal: Normal range of motion. She exhibits no edema.   Neurological: She is alert and oriented to person, place, and time. No cranial nerve deficit.   Skin: Skin is warm.   Psychiatric: She has a normal mood and affect. Her behavior is normal. Judgment and thought " content normal.       Assessment:     1. Sinus pause  EKG   2. Tachycardia  EKG   3. Vasovagal syncope     4. POTS (postural orthostatic tachycardia syndrome)     5. Palpitations     6. Dizziness         Medical Decision Making:  Today's Assessment / Status / Plan:   1.  Vasovagal syncope and POTS syndrome.  No indication for pacing.  Would avoid beta-blockers.  Continue hydration with fluids and salt at least 80 ounces a day.  No restrictions and exercise as long as hydrating.  If worsen could consider Florinef or midodrine.  2.  Follow-up as needed.

## 2019-08-28 PROCEDURE — 0296T PR EXT ECG > 48HR TO 21 DAY RCRD W/CONECT INTL RCRD: CPT | Performed by: INTERNAL MEDICINE

## 2019-08-28 PROCEDURE — 0298T PR EXT ECG > 48HR TO 21 DAY REVIEW AND INTERPRETATN: CPT | Performed by: INTERNAL MEDICINE

## 2019-08-29 ENCOUNTER — TELEPHONE (OUTPATIENT)
Dept: CARDIOLOGY | Facility: MEDICAL CENTER | Age: 19
End: 2019-08-29

## 2019-08-29 NOTE — TELEPHONE ENCOUNTER
----- Message from Maria Ines Ryder M.D. sent at 8/28/2019 10:08 PM PDT -----  Regarding: FW: Zio Patch 7.29.19  Read zio.  No major arrhythmias.  Many symptoms correlating with sinus tachycardia.  As discussed by Dr. Hunt, patient should increase her hydration.  Avoid caffeine.    ----- Message -----  From: Giuseppe Gastelum Ass't  Sent: 8/23/2019   3:10 PM PDT  To: Maria Ines Ryder M.D.  Subject: Zio Patch 7.29.19                                Please dictate impression/narrative for 7.29.19 Zio Patch. Results scanned in 8.16.19. Thank you!

## 2019-09-24 ENCOUNTER — IMMUNIZATION (OUTPATIENT)
Dept: OCCUPATIONAL MEDICINE | Facility: CLINIC | Age: 19
End: 2019-09-24

## 2019-09-24 DIAGNOSIS — Z23 NEED FOR VACCINATION: ICD-10-CM

## 2019-09-24 PROCEDURE — 90686 IIV4 VACC NO PRSV 0.5 ML IM: CPT | Performed by: PREVENTIVE MEDICINE

## 2019-11-30 ENCOUNTER — OFFICE VISIT (OUTPATIENT)
Dept: URGENT CARE | Facility: PHYSICIAN GROUP | Age: 19
End: 2019-11-30
Payer: COMMERCIAL

## 2019-11-30 VITALS
WEIGHT: 167 LBS | HEART RATE: 74 BPM | HEIGHT: 68 IN | RESPIRATION RATE: 16 BRPM | TEMPERATURE: 98.7 F | SYSTOLIC BLOOD PRESSURE: 104 MMHG | BODY MASS INDEX: 25.31 KG/M2 | OXYGEN SATURATION: 97 % | DIASTOLIC BLOOD PRESSURE: 62 MMHG

## 2019-11-30 DIAGNOSIS — J02.9 PHARYNGITIS, UNSPECIFIED ETIOLOGY: ICD-10-CM

## 2019-11-30 LAB
INT CON NEG: NORMAL
INT CON POS: NORMAL
S PYO AG THROAT QL: NEGATIVE

## 2019-11-30 PROCEDURE — 87880 STREP A ASSAY W/OPTIC: CPT | Performed by: FAMILY MEDICINE

## 2019-11-30 PROCEDURE — 99214 OFFICE O/P EST MOD 30 MIN: CPT | Performed by: FAMILY MEDICINE

## 2019-11-30 RX ORDER — AMOXICILLIN 500 MG/1
1000 CAPSULE ORAL 2 TIMES DAILY
Qty: 40 CAP | Refills: 0 | Status: SHIPPED | OUTPATIENT
Start: 2019-11-30 | End: 2019-12-10

## 2019-11-30 ASSESSMENT — ENCOUNTER SYMPTOMS
TROUBLE SWALLOWING: 1
STRIDOR: 0
SWOLLEN GLANDS: 0
HOARSE VOICE: 1

## 2019-11-30 NOTE — PROGRESS NOTES
"  Subjective:   Chyna Ibrahim a 19 y.o. female who presents for Pharyngitis (Cough, scratchy throat, fever x 5 days)    Pharyngitis    This is a new problem. The current episode started in the past 7 days. The problem has been rapidly worsening. Neither side of throat is experiencing more pain than the other. There has been no fever. The pain is moderate. Associated symptoms include a hoarse voice and trouble swallowing. Pertinent negatives include no stridor or swollen glands.     Review of Systems   HENT: Positive for hoarse voice and trouble swallowing.    Respiratory: Negative for stridor.      Allergies   Allergen Reactions   • Nitroglycerin    • Sulfa Drugs Diarrhea and Nausea     Sick for weeks      Objective:   /62   Pulse 74   Temp 37.1 °C (98.7 °F) (Temporal)   Resp 16   Ht 1.727 m (5' 8\")   Wt 75.8 kg (167 lb)   LMP 11/27/2019 (Exact Date)   SpO2 97%   BMI 25.39 kg/m²   Physical Exam  Constitutional:       General: She is not in acute distress.     Appearance: She is well-developed.   HENT:      Head: Normocephalic and atraumatic.      Mouth/Throat:      Pharynx: Uvula midline. Posterior oropharyngeal erythema present.      Tonsils: No tonsillar abscesses.   Eyes:      Conjunctiva/sclera: Conjunctivae normal.      Pupils: Pupils are equal, round, and reactive to light.   Cardiovascular:      Rate and Rhythm: Normal rate and regular rhythm.      Heart sounds: No murmur.   Pulmonary:      Effort: Pulmonary effort is normal. No respiratory distress.      Breath sounds: Normal breath sounds.   Abdominal:      General: There is no distension.      Palpations: Abdomen is soft.      Tenderness: There is no tenderness.   Skin:     General: Skin is warm and dry.   Neurological:      Mental Status: She is alert and oriented to person, place, and time.      Sensory: No sensory deficit.      Deep Tendon Reflexes: Reflexes are normal and symmetric.       Assessment/Plan:   Assessment    1. " Pharyngitis, unspecified etiology  - POCT Rapid Strep A  - amoxicillin (AMOXIL) 500 MG Cap; Take 2 Caps by mouth 2 times a day for 10 days.  Dispense: 40 Cap; Refill: 0      Differential diagnosis, natural history, supportive care, and indications for immediate follow-up discussed.  Return if symptoms worsen or fail to improve.

## 2019-11-30 NOTE — LETTER
November 30, 2019         Patient: Chyna Bond   YOB: 2000   Date of Visit: 11/30/2019           To Whom it May Concern:    Chyna Bond was seen in my clinic on 11/30/2019. She may return to work on 12/3/2019..    If you have any questions or concerns, please don't hesitate to call.        Sincerely,           Ethan Funk M.D.  Electronically Signed

## 2020-03-05 ENCOUNTER — TELEPHONE (OUTPATIENT)
Dept: SCHEDULING | Facility: IMAGING CENTER | Age: 20
End: 2020-03-05

## 2020-03-06 ENCOUNTER — OFFICE VISIT (OUTPATIENT)
Dept: MEDICAL GROUP | Facility: MEDICAL CENTER | Age: 20
End: 2020-03-06
Payer: COMMERCIAL

## 2020-03-06 VITALS
HEIGHT: 68 IN | SYSTOLIC BLOOD PRESSURE: 112 MMHG | HEART RATE: 67 BPM | BODY MASS INDEX: 23.64 KG/M2 | OXYGEN SATURATION: 97 % | DIASTOLIC BLOOD PRESSURE: 72 MMHG | WEIGHT: 156 LBS | RESPIRATION RATE: 16 BRPM

## 2020-03-06 DIAGNOSIS — Z00.00 ROUTINE GENERAL MEDICAL EXAMINATION AT A HEALTH CARE FACILITY: ICD-10-CM

## 2020-03-06 DIAGNOSIS — J45.20 MILD INTERMITTENT ASTHMA WITHOUT COMPLICATION: ICD-10-CM

## 2020-03-06 DIAGNOSIS — Z23 NEED FOR PNEUMOCOCCAL VACCINATION: ICD-10-CM

## 2020-03-06 PROBLEM — R42 DIZZINESS: Status: RESOLVED | Noted: 2019-06-07 | Resolved: 2020-03-06

## 2020-03-06 PROBLEM — R55 VASOVAGAL SYNCOPE: Status: RESOLVED | Noted: 2019-08-27 | Resolved: 2020-03-06

## 2020-03-06 PROBLEM — R00.2 PALPITATIONS: Status: RESOLVED | Noted: 2019-06-07 | Resolved: 2020-03-06

## 2020-03-06 PROCEDURE — 90471 IMMUNIZATION ADMIN: CPT | Performed by: NURSE PRACTITIONER

## 2020-03-06 PROCEDURE — 99395 PREV VISIT EST AGE 18-39: CPT | Mod: 25 | Performed by: NURSE PRACTITIONER

## 2020-03-06 PROCEDURE — 90732 PPSV23 VACC 2 YRS+ SUBQ/IM: CPT | Performed by: NURSE PRACTITIONER

## 2020-03-06 ASSESSMENT — FIBROSIS 4 INDEX: FIB4 SCORE: 0.2

## 2020-03-06 ASSESSMENT — PATIENT HEALTH QUESTIONNAIRE - PHQ9: CLINICAL INTERPRETATION OF PHQ2 SCORE: 0

## 2020-03-06 NOTE — PROGRESS NOTES
Subjective:      Chyna Bond is a 19 y.o. female who presents with Establish Care        CC: Patient is here today to establish care and general physical    HPI 1. Routine general medical examination at a health care facility  Patient states she feels generally healthy and has no physical or mental health complaints today.  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  He states she is not using alcohol or drugs and is sexually monogamous and on the birth control pill.    2. Mild intermittent asthma without complication  Patient states she rarely gets problems with asthma and it is usually related to heavy exercise and when it occurs she will use her albuterol for relief.    3. Need for pneumococcal vaccination  Patient due for vaccine due to history of asthma        Past Medical History:   Diagnosis Date   • Allergy    • Psychiatric disorder     depression, bipolar     Social History     Socioeconomic History   • Marital status: Single     Spouse name: Not on file   • Number of children: Not on file   • Years of education: Not on file   • Highest education level: Not on file   Occupational History   • Not on file   Social Needs   • Financial resource strain: Not on file   • Food insecurity     Worry: Not on file     Inability: Not on file   • Transportation needs     Medical: Not on file     Non-medical: Not on file   Tobacco Use   • Smoking status: Never Smoker   • Smokeless tobacco: Never Used   Substance and Sexual Activity   • Alcohol use: No   • Drug use: No   • Sexual activity: Yes     Partners: Male     Birth control/protection: Pill   Lifestyle   • Physical activity     Days per week: Not on file     Minutes per session: Not on file   • Stress: Not on file   Relationships   • Social connections     Talks on phone: Not on file     Gets together: Not on file     Attends Christian service: Not on file     Active member of club or organization: Not on file     Attends meetings of clubs or  "organizations: Not on file     Relationship status: Not on file   • Intimate partner violence     Fear of current or ex partner: Not on file     Emotionally abused: Not on file     Physically abused: Not on file     Forced sexual activity: Not on file   Other Topics Concern   • Behavioral problems Not Asked   • Interpersonal relationships Not Asked   • Sad or not enjoying activities Not Asked   • Suicidal thoughts Not Asked   • Poor school performance Not Asked   • Reading difficulties Not Asked   • Speech difficulties Not Asked   • Writing difficulties Not Asked   • Inadequate sleep Not Asked   • Excessive TV viewing Not Asked   • Excessive video game use Not Asked   • Inadequate exercise Not Asked   • Sports related Not Asked   • Poor diet Not Asked   • Family concerns for drug/alcohol abuse Not Asked   • Poor oral hygiene Not Asked   • Bike safety Not Asked   • Family concerns vehicle safety Not Asked   Social History Narrative   • Not on file     Current Outpatient Medications   Medication Sig Dispense Refill   • DAYSEE 0.15-0.03 &0.01 MG Tab Take 1 Tab by mouth every day.  3   • albuterol 108 (90 Base) MCG/ACT Aero Soln inhalation aerosol Inhale 2 Puffs by mouth every four hours as needed for Shortness of Breath. 1 Inhaler 0     No current facility-administered medications for this visit.      History reviewed. No pertinent family history.      Review of Systems   All other systems reviewed and are negative.         Objective:     /72 (BP Location: Right arm, Patient Position: Sitting, BP Cuff Size: Adult)   Pulse 67   Resp 16   Ht 1.727 m (5' 8\")   Wt 70.8 kg (156 lb)   SpO2 97%   BMI 23.72 kg/m²      Physical Exam  Vitals signs and nursing note reviewed.   Constitutional:       General: She is not in acute distress.     Appearance: She is well-developed. She is not diaphoretic.   HENT:      Head: Normocephalic and atraumatic.      Right Ear: External ear normal.      Left Ear: External ear normal. "      Nose: Nose normal.   Eyes:      General:         Right eye: No discharge.         Left eye: No discharge.   Neck:      Musculoskeletal: Normal range of motion and neck supple.      Thyroid: No thyromegaly.   Cardiovascular:      Rate and Rhythm: Normal rate and regular rhythm.      Heart sounds: Normal heart sounds. No murmur. No friction rub. No gallop.    Pulmonary:      Effort: Pulmonary effort is normal.      Breath sounds: Normal breath sounds. No wheezing or rales.   Abdominal:      General: Bowel sounds are normal. There is no distension.      Palpations: Abdomen is soft. There is no mass.      Tenderness: There is no abdominal tenderness. There is no guarding or rebound.   Musculoskeletal:         General: No tenderness.   Skin:     General: Skin is warm and dry.      Findings: No rash.   Neurological:      Mental Status: She is alert and oriented to person, place, and time.      Deep Tendon Reflexes: Reflexes normal.   Psychiatric:         Behavior: Behavior normal.         Thought Content: Thought content normal.         Judgment: Judgment normal.                 Assessment/Plan:       1. Routine general medical examination at a health care facility  Vital signs and physical today appear within normal range and I recommended yearly follow-up.  He seems to be up-to-date on her Pap smears and we discussed her immunizations today.  She declined lab work.    2. Mild intermittent asthma without complication  Patient will continue to use albuterol as needed and does not appear to need a preventative inhaler.    3. Need for pneumococcal vaccination  I have placed the below orders and discussed them with an approved delegating provider. The MA is performing the below orders under the direction of Dr. Smyth    - Pneumococal Polysaccharide Vaccine 23-Valent =>3YO SQ/IM

## 2020-03-16 RX ORDER — ALBUTEROL SULFATE 90 UG/1
2 AEROSOL, METERED RESPIRATORY (INHALATION) EVERY 4 HOURS PRN
Qty: 1 INHALER | Refills: 11 | Status: SHIPPED | OUTPATIENT
Start: 2020-03-16

## 2020-04-07 ENCOUNTER — HOSPITAL ENCOUNTER (OUTPATIENT)
Facility: MEDICAL CENTER | Age: 20
End: 2020-04-07
Attending: FAMILY MEDICINE
Payer: COMMERCIAL

## 2020-04-07 ENCOUNTER — OFFICE VISIT (OUTPATIENT)
Dept: URGENT CARE | Facility: PHYSICIAN GROUP | Age: 20
End: 2020-04-07
Payer: COMMERCIAL

## 2020-04-07 VITALS
HEART RATE: 74 BPM | RESPIRATION RATE: 16 BRPM | WEIGHT: 156 LBS | BODY MASS INDEX: 23.64 KG/M2 | SYSTOLIC BLOOD PRESSURE: 118 MMHG | HEIGHT: 68 IN | OXYGEN SATURATION: 96 % | TEMPERATURE: 98 F | DIASTOLIC BLOOD PRESSURE: 62 MMHG

## 2020-04-07 DIAGNOSIS — N90.89 VULVAR EDEMA: ICD-10-CM

## 2020-04-07 DIAGNOSIS — N76.0 BACTERIAL VAGINOSIS: ICD-10-CM

## 2020-04-07 DIAGNOSIS — N76.0 ACUTE VAGINITIS: ICD-10-CM

## 2020-04-07 DIAGNOSIS — B37.31 CANDIDAL VAGINITIS: ICD-10-CM

## 2020-04-07 DIAGNOSIS — B96.89 BACTERIAL VAGINOSIS: ICD-10-CM

## 2020-04-07 LAB
APPEARANCE UR: NORMAL
BILIRUB UR STRIP-MCNC: NEGATIVE MG/DL
CANDIDA DNA VAG QL PROBE+SIG AMP: POSITIVE
COLOR UR AUTO: YELLOW
G VAGINALIS DNA VAG QL PROBE+SIG AMP: POSITIVE
GLUCOSE UR STRIP.AUTO-MCNC: NEGATIVE MG/DL
INT CON NEG: NEGATIVE
INT CON POS: POSITIVE
KETONES UR STRIP.AUTO-MCNC: NEGATIVE MG/DL
LEUKOCYTE ESTERASE UR QL STRIP.AUTO: NORMAL
NITRITE UR QL STRIP.AUTO: NEGATIVE
PH UR STRIP.AUTO: 6 [PH] (ref 5–8)
POC URINE PREGNANCY TEST: NEGATIVE
PROT UR QL STRIP: NORMAL MG/DL
RBC UR QL AUTO: NORMAL
SP GR UR STRIP.AUTO: 1.03
T VAGINALIS DNA VAG QL PROBE+SIG AMP: NEGATIVE
UROBILINOGEN UR STRIP-MCNC: 0.2 MG/DL

## 2020-04-07 PROCEDURE — 87480 CANDIDA DNA DIR PROBE: CPT

## 2020-04-07 PROCEDURE — 87491 CHLMYD TRACH DNA AMP PROBE: CPT

## 2020-04-07 PROCEDURE — 87660 TRICHOMONAS VAGIN DIR PROBE: CPT

## 2020-04-07 PROCEDURE — 99214 OFFICE O/P EST MOD 30 MIN: CPT | Performed by: FAMILY MEDICINE

## 2020-04-07 PROCEDURE — 87591 N.GONORRHOEAE DNA AMP PROB: CPT

## 2020-04-07 PROCEDURE — 81025 URINE PREGNANCY TEST: CPT | Performed by: FAMILY MEDICINE

## 2020-04-07 PROCEDURE — 81002 URINALYSIS NONAUTO W/O SCOPE: CPT | Performed by: FAMILY MEDICINE

## 2020-04-07 PROCEDURE — 87510 GARDNER VAG DNA DIR PROBE: CPT

## 2020-04-07 ASSESSMENT — ENCOUNTER SYMPTOMS
MYALGIAS: 0
NAUSEA: 0
VOMITING: 0
SORE THROAT: 0
EYE REDNESS: 0
CHILLS: 0
FEVER: 0
DIZZINESS: 0
SHORTNESS OF BREATH: 0

## 2020-04-07 ASSESSMENT — FIBROSIS 4 INDEX: FIB4 SCORE: 0.2

## 2020-04-07 NOTE — PROGRESS NOTES
Subjective:   Chyna Bond is a 19 y.o. female who presents for Groin Pain (Groin pain/swelling x1day )        19-year-old female presents to urgent care with chief complaint of right-sided vulvar swelling.  Patient denies change in vaginal discharge, denies copious vaginal discharge, denies vaginal bleeding.  Patient denies abdominal pain denies pelvic pain.  The patient had used a prosthetic device in the vicinity of the area of swelling yet after the swelling had started.  Patient states the lubrication product was the same that had been used for the prior 2 years and denies recent change of creams lotions or topical products.  Denies rash.  The patient mentions the swelling in the right side vulva has decreased in size and symptoms from this morning and has been gradually improving.    Groin Pain   This is a new problem. The current episode started today. Pertinent negatives include no chest pain, chills, fever, myalgias, nausea, rash, sore throat or vomiting.     PMH:  has a past medical history of Allergy and Psychiatric disorder. She also has no past medical history of ASTHMA or Diabetes.  MEDS:   Current Outpatient Medications:   •  metroNIDAZOLE (FLAGYL) 500 MG Tab, Take 1 Tab by mouth 2 Times a Day for 7 days., Disp: 14 Tab, Rfl: 0  •  fluconazole (DIFLUCAN) 150 MG tablet, Take 1 Tab by mouth Once for 1 dose., Disp: 1 Tab, Rfl: 0  •  albuterol 108 (90 Base) MCG/ACT Aero Soln inhalation aerosol, Inhale 2 Puffs by mouth every four hours as needed for Shortness of Breath., Disp: 1 Inhaler, Rfl: 11  •  DAYSEE 0.15-0.03 &0.01 MG Tab, Take 1 Tab by mouth every day., Disp: , Rfl: 3  ALLERGIES:   Allergies   Allergen Reactions   • Nitroglycerin    • Sulfa Drugs Diarrhea and Nausea     Sick for weeks     SURGHX:   Past Surgical History:   Procedure Laterality Date   • MYRINGOTOMY       SOCHX:  reports that she has never smoked. She has never used smokeless tobacco. She reports that she does not drink  "alcohol or use drugs.  FH: Family history was reviewed  Review of Systems   Constitutional: Negative for chills and fever.   HENT: Negative for sore throat.    Eyes: Negative for redness.   Respiratory: Negative for shortness of breath.    Cardiovascular: Negative for chest pain.   Gastrointestinal: Negative for nausea and vomiting.   Genitourinary: Negative for dysuria.   Musculoskeletal: Negative for myalgias.   Skin: Negative for rash.   Neurological: Negative for dizziness.   All other systems reviewed and are negative.       Objective:   /62   Pulse 74   Temp 36.7 °C (98 °F) (Temporal)   Resp 16   Ht 1.727 m (5' 8\")   Wt 70.8 kg (156 lb)   SpO2 96%   Breastfeeding No   BMI 23.72 kg/m²   Physical Exam  Vitals signs and nursing note reviewed. Exam conducted with a chaperone present.   Constitutional:       General: She is not in acute distress.     Appearance: She is well-developed.   HENT:      Head: Normocephalic and atraumatic.      Right Ear: External ear normal.      Left Ear: External ear normal.      Nose: Nose normal.      Mouth/Throat:      Mouth: Mucous membranes are moist.   Eyes:      Conjunctiva/sclera: Conjunctivae normal.   Cardiovascular:      Rate and Rhythm: Normal rate.      Heart sounds: No murmur.   Pulmonary:      Effort: Pulmonary effort is normal. No respiratory distress.      Breath sounds: Normal breath sounds.   Abdominal:      General: There is no distension.   Genitourinary:     Labia:         Left: Tenderness present. No rash, lesion or injury.       Comments: Left labia mildly edematous, no skin break, no rash, no vesicles, trace vaginal discharge, no vaginal bleeding  Musculoskeletal: Normal range of motion.   Skin:     General: Skin is warm and dry.   Neurological:      General: No focal deficit present.      Mental Status: She is alert and oriented to person, place, and time. Mental status is at baseline.      Gait: Gait (gait at baseline) normal.   Psychiatric:    "      Judgment: Judgment normal.     Point-of-care urine pregnancy: Negative    Vaginal pathogen panel and GC chlamydia vaginal pending    Results for TAHIRA CALIX (MRN [id number]) as of 4/8/2020 08:19   Ref. Range 4/7/2020 10:45   Candida species DNA Probe Latest Ref Range: Negative  POSITIVE (A)   Gardnerella vaginalis DNA Probe Latest Ref Range: Negative  POSITIVE (A)   Source Unknown Genital   Trichamonas vaginalis DNA Probe Latest Ref Range: Negative  Negative           Assessment/Plan:   1. Bacterial vaginosis  - metroNIDAZOLE (FLAGYL) 500 MG Tab; Take 1 Tab by mouth 2 Times a Day for 7 days.  Dispense: 14 Tab; Refill: 0    2. Candidal vaginitis  - fluconazole (DIFLUCAN) 150 MG tablet; Take 1 Tab by mouth Once for 1 dose.  Dispense: 1 Tab; Refill: 0    3. Acute vaginitis  - POCT Pregnancy  - POCT Urinalysis  - CHLAMYDIA/GC PCR URINE OR SWAB; Future  - VAGINAL PATHOGENS DNA PANEL; Future    4. Vulvar edema  - POCT Pregnancy  - POCT Urinalysis  - CHLAMYDIA/GC PCR URINE OR SWAB; Future  - VAGINAL PATHOGENS DNA PANEL; Future      Advised over-the-counter hydrocortisone cream as needed for vulvar edema.  Will contact patient with the results of the laboratory testing as noted above.  Symptomatic and supportive measures.      Discussed close monitoring, return precautions, and supportive measures including maintaining adequate fluid hydration and caloric intake, relative rest and OTC symptom management including acetaminophen as needed for pain and/or fever.    Differential diagnosis, natural history, supportive care, and indications for immediate follow-up discussed.     Advised the patient to follow-up with the primary care physician for recheck, reevaluation, and consideration of further management.    Please note that this dictation was created using voice recognition software. I have worked with consultants from the vendor as well as technical experts from [employer] to optimize the interface. I have  made every reasonable attempt to correct obvious errors, but I expect that there are errors of grammar and possibly content that I did not discover before finalizing the note.

## 2020-04-07 NOTE — PATIENT INSTRUCTIONS
Vaginitis  Vaginitis is an inflammation of the vagina. It can happen when the normal bacteria and yeast in the vagina grow too much. There are different types. Treatment will depend on the type you have.  Follow these instructions at home:  · Take all medicines as told by your doctor.  · Keep your vagina area clean and dry. Avoid soap. Rinse the area with water.  · Avoid washing and cleaning out the vagina (douching).  · Do not use tampons or have sex (intercourse) until your treatment is done.  · Wipe from front to back after going to the restroom.  · Wear cotton underwear.  · Avoid wearing underwear while you sleep until your vaginitis is gone.  · Avoid tight pants. Avoid underwear or nylons without a cotton panel.  · Take off wet clothing (such as a bathing suit) as soon as you can.  · Use mild, unscented products. Avoid fabric softeners and scented:  ¨ Feminine sprays.  ¨ Laundry detergents.  ¨ Tampons.  ¨ Soaps or bubble baths.  · Practice safe sex and use condoms.  Get help right away if:  · You have belly (abdominal) pain.  · You have a fever or lasting symptoms for more than 2-3 days.  · You have a fever and your symptoms suddenly get worse.  This information is not intended to replace advice given to you by your health care provider. Make sure you discuss any questions you have with your health care provider.  Document Released: 03/16/2010 Document Revised: 05/25/2017 Document Reviewed: 05/30/2013  [employer] Interactive Patient Education © 2017 [employer]

## 2020-04-08 RX ORDER — FLUCONAZOLE 150 MG/1
150 TABLET ORAL ONCE
Qty: 1 TAB | Refills: 0 | Status: SHIPPED | OUTPATIENT
Start: 2020-04-08 | End: 2020-04-08

## 2020-04-08 RX ORDER — METRONIDAZOLE 500 MG/1
500 TABLET ORAL 2 TIMES DAILY
Qty: 14 TAB | Refills: 0 | Status: SHIPPED | OUTPATIENT
Start: 2020-04-08 | End: 2020-04-15

## 2020-04-08 NOTE — RESULT ENCOUNTER NOTE
Please call patient and let them know the vaginal pathogens panel showed Gardnerella vaginalis which is a bacteria which can cause bacterial vaginosis and accordingly medication metronidazole 500 mg twice a day for 7 days was called into the pharmacy.  The vaginal pathogens panel also showed Candida species which is indicative of a yeast infection and accordingly an antifungal medication, fluconazole 150 mg to be taken 1 time, will be called into the pharmacy.  Advised to follow-up with the primary care provider for recheck reevaluation and consideration of further management and to return for any persistent or worsening symptoms.

## 2020-05-12 ENCOUNTER — HOSPITAL ENCOUNTER (EMERGENCY)
Facility: MEDICAL CENTER | Age: 20
End: 2020-05-12
Attending: EMERGENCY MEDICINE | Admitting: EMERGENCY MEDICINE
Payer: COMMERCIAL

## 2020-05-12 VITALS
WEIGHT: 150.13 LBS | RESPIRATION RATE: 16 BRPM | DIASTOLIC BLOOD PRESSURE: 69 MMHG | HEIGHT: 68 IN | BODY MASS INDEX: 22.75 KG/M2 | SYSTOLIC BLOOD PRESSURE: 117 MMHG | HEART RATE: 78 BPM | OXYGEN SATURATION: 100 % | TEMPERATURE: 97.2 F

## 2020-05-12 DIAGNOSIS — N76.0 ACUTE VAGINITIS: ICD-10-CM

## 2020-05-12 DIAGNOSIS — B37.31 YEAST VAGINITIS: ICD-10-CM

## 2020-05-12 LAB
APPEARANCE UR: CLEAR
BACTERIA #/AREA URNS HPF: ABNORMAL /HPF
BILIRUB UR QL STRIP.AUTO: NEGATIVE
C TRACH DNA SPEC QL NAA+PROBE: NEGATIVE
CANDIDA DNA VAG QL PROBE+SIG AMP: NEGATIVE
COLOR UR: YELLOW
EPI CELLS #/AREA URNS HPF: ABNORMAL /HPF
G VAGINALIS DNA VAG QL PROBE+SIG AMP: POSITIVE
GLUCOSE UR STRIP.AUTO-MCNC: NEGATIVE MG/DL
HCG UR QL: NEGATIVE
HYALINE CASTS #/AREA URNS LPF: ABNORMAL /LPF
KETONES UR STRIP.AUTO-MCNC: NEGATIVE MG/DL
LEUKOCYTE ESTERASE UR QL STRIP.AUTO: ABNORMAL
MICRO URNS: ABNORMAL
MUCOUS THREADS #/AREA URNS HPF: ABNORMAL /HPF
N GONORRHOEA DNA SPEC QL NAA+PROBE: NEGATIVE
NITRITE UR QL STRIP.AUTO: NEGATIVE
PH UR STRIP.AUTO: 6.5 [PH] (ref 5–8)
PROT UR QL STRIP: NEGATIVE MG/DL
RBC # URNS HPF: ABNORMAL /HPF
RBC UR QL AUTO: NEGATIVE
SP GR UR STRIP.AUTO: 1.02
SPECIMEN SOURCE: NORMAL
SPERM #/AREA URNS HPF: ABNORMAL /HPF
T VAGINALIS DNA VAG QL PROBE+SIG AMP: NEGATIVE
WBC #/AREA URNS HPF: ABNORMAL /HPF

## 2020-05-12 PROCEDURE — 87480 CANDIDA DNA DIR PROBE: CPT

## 2020-05-12 PROCEDURE — 87591 N.GONORRHOEAE DNA AMP PROB: CPT

## 2020-05-12 PROCEDURE — 87660 TRICHOMONAS VAGIN DIR PROBE: CPT

## 2020-05-12 PROCEDURE — 99284 EMERGENCY DEPT VISIT MOD MDM: CPT

## 2020-05-12 PROCEDURE — 87491 CHLMYD TRACH DNA AMP PROBE: CPT

## 2020-05-12 PROCEDURE — 81025 URINE PREGNANCY TEST: CPT

## 2020-05-12 PROCEDURE — 700102 HCHG RX REV CODE 250 W/ 637 OVERRIDE(OP): Performed by: EMERGENCY MEDICINE

## 2020-05-12 PROCEDURE — 81001 URINALYSIS AUTO W/SCOPE: CPT

## 2020-05-12 PROCEDURE — A9270 NON-COVERED ITEM OR SERVICE: HCPCS | Performed by: EMERGENCY MEDICINE

## 2020-05-12 PROCEDURE — 87510 GARDNER VAG DNA DIR PROBE: CPT

## 2020-05-12 RX ORDER — FLUCONAZOLE 200 MG/1
200 TABLET ORAL ONCE
Status: COMPLETED | OUTPATIENT
Start: 2020-05-12 | End: 2020-05-12

## 2020-05-12 RX ORDER — FLUCONAZOLE 200 MG/1
200 TABLET ORAL DAILY
Qty: 7 TAB | Refills: 0 | Status: SHIPPED | OUTPATIENT
Start: 2020-05-12 | End: 2020-05-19

## 2020-05-12 RX ADMIN — FLUCONAZOLE 200 MG: 200 TABLET ORAL at 10:09

## 2020-05-12 NOTE — ED NOTES
Discharged to home with instructions and prescriptions. Patient has appt to follow up with gynecology on Friday.

## 2020-05-12 NOTE — ED PROVIDER NOTES
ED Provider Note    CHIEF COMPLAINT  Chief Complaint   Patient presents with   • Painful Urination     painful urination since saturday  Finished 2 weeks of antibiotics for swelling of vulva       HPI  Chyna Bond is a 19 y.o. female who presents to the emergency department with a chief complaint of dysuria and vulvar swelling.  Patient was seen about a month ago and diagnosed with vaginitis.  She was given a prescription for Flagyl for bacterial vaginosis as well as a dose of fluconazole.  She took these medications.  She did feel little bit better initially but then it seemed to get worse.  No respiratory distress was noticed pain and swelling over her vulva.  Is been some whitish discharge..  Pain radiates up into her lower abdomen.  She does have some associated dysuria.    REVIEW OF SYSTEMS  See HPI for further details. All other systems are negative.     PAST MEDICAL HISTORY  Past Medical History:   Diagnosis Date   • Allergy    • Psychiatric disorder     depression, bipolar       FAMILY HISTORY  History reviewed. No pertinent family history.    SOCIAL HISTORY  Social History     Socioeconomic History   • Marital status: Single     Spouse name: Not on file   • Number of children: Not on file   • Years of education: Not on file   • Highest education level: Not on file   Occupational History   • Not on file   Social Needs   • Financial resource strain: Not on file   • Food insecurity     Worry: Not on file     Inability: Not on file   • Transportation needs     Medical: Not on file     Non-medical: Not on file   Tobacco Use   • Smoking status: Never Smoker   • Smokeless tobacco: Never Used   Substance and Sexual Activity   • Alcohol use: No   • Drug use: No   • Sexual activity: Yes     Partners: Male     Birth control/protection: Pill   Lifestyle   • Physical activity     Days per week: Not on file     Minutes per session: Not on file   • Stress: Not on file   Relationships   • Social connections      "Talks on phone: Not on file     Gets together: Not on file     Attends [employer]ical service: Not on file     Active member of club or organization: Not on file     Attends meetings of clubs or organizations: Not on file     Relationship status: Not on file   • Intimate partner violence     Fear of current or ex partner: Not on file     Emotionally abused: Not on file     Physically abused: Not on file     Forced sexual activity: Not on file   Other Topics Concern   • Behavioral problems Not Asked   • Interpersonal relationships Not Asked   • Sad or not enjoying activities Not Asked   • Suicidal thoughts Not Asked   • Poor school performance Not Asked   • Reading difficulties Not Asked   • Speech difficulties Not Asked   • Writing difficulties Not Asked   • Inadequate sleep Not Asked   • Excessive TV viewing Not Asked   • Excessive video game use Not Asked   • Inadequate exercise Not Asked   • Sports related Not Asked   • Poor diet Not Asked   • Family concerns for drug/alcohol abuse Not Asked   • Poor oral hygiene Not Asked   • Bike safety Not Asked   • Family concerns vehicle safety Not Asked   Social History Narrative   • Not on file       SURGICAL HISTORY  Past Surgical History:   Procedure Laterality Date   • MYRINGOTOMY         CURRENT MEDICATIONS  Home Medications    **Home medications have not yet been reviewed for this encounter**         ALLERGIES  Allergies   Allergen Reactions   • Nitroglycerin    • Sulfa Drugs Diarrhea and Nausea     Sick for weeks       PHYSICAL EXAM  VITAL SIGNS: /69   Pulse 78   Temp 36.2 °C (97.2 °F) (Temporal)   Resp 16   Ht 1.727 m (5' 8\")   Wt 68.1 kg (150 lb 2.1 oz)   LMP 05/04/2020   SpO2 100%   Breastfeeding No   BMI 22.83 kg/m²   Constitutional: Well developed, Well nourished, mild distress, Non-toxic appearance.   HENT: Normocephalic, Atraumatic, Bilateral external ears normal, Oropharynx moist, No oral exudates, Nose normal.   Eyes: PERRL, EOMI, Conjunctiva " normal, No discharge.   Neck: Normal range of motion, No tenderness, Supple, No stridor.   Lymphatic: No lymphadenopathy noted.   Cardiovascular: Normal heart rate, Normal rhythm, No murmurs, No rubs, No gallops.   Thorax & Lungs: Normal breath sounds, No respiratory distress, No wheezing, No chest tenderness.   Abdomen: Soft, mild lower abdominal tenderness without any rebound or guarding.  No tenderness at McBurney's point.  : There is excoriation and maceration of the vulva in the fornix along the labia minor Jasmyne to the clitoral blue.  There is whitish associated discharge.  Very tender to palpation.  Unable to tolerate speculum exam.  No abscess.  Skin: Warm, Dry, No erythema, No rash.   Back: No tenderness, No CVA tenderness.   Extremities: Intact distal pulses, No edema, No tenderness, No cyanosis, No clubbing.   Neurologic: Alert & oriented x 3, Normal motor function, Normal sensory function, No focal deficits noted.       RADIOLOGY/PROCEDURES  Results for orders placed or performed during the hospital encounter of 05/12/20   URINALYSIS,CULTURE IF INDICATED   Result Value Ref Range    Color Yellow     Character Clear     Specific Gravity 1.025 <1.035    Ph 6.5 5.0 - 8.0    Glucose Negative Negative mg/dL    Ketones Negative Negative mg/dL    Protein Negative Negative mg/dL    Bilirubin Negative Negative    Nitrite Negative Negative    Leukocyte Esterase Small (A) Negative    Occult Blood Negative Negative    Micro Urine Req Microscopic    BETA-HCG QUALITATIVE URINE   Result Value Ref Range    Beta-Hcg Urine Negative Negative   URINE MICROSCOPIC (W/UA)   Result Value Ref Range    WBC 10-20 (A) /hpf    RBC 5-10 (A) /hpf    Bacteria Few (A) None /hpf    Epithelial Cells Many (A) Few /hpf    Mucous Threads Few /hpf    Hyaline Cast 0-2 /lpf    Sperm Rare /hpf   CHLAMYDIA & GC BY PCR   Result Value Ref Range    Source Vaginal          COURSE & MEDICAL DECISION MAKING  Pertinent Labs & Imaging studies reviewed.  (See chart for details)    Patient presents today with vulvovaginitis.  Feel this is likely candidal in nature.  I reviewed the electronic medical record.  The patient had negative swab for vaginal pathogens as well as sexually transmitted infections.  Today her urinalysis demonstrates 10-20 white blood cells but I feel this is likely contaminated from the vulvovaginitis and does not represent a urinary tract infection.    Vaginal pathogen swab is pending.  Gonorrhea chlamydia PCR is pending.    We will treat the patient for suspected candidal vulvovaginitis with oral fluconazole as well as miconazole cream.  Patient has made an appointment with her gynecologist to be seen on Friday.  Discharged home in stable condition.    The patient will return for new or worsening symptoms and is stable at the time of discharge.    The patient is referred to a primary physician for blood pressure management, diabetic screening, and for all other preventative health concerns.    DISPOSITION:  Patient will be discharged home in stable condition.    FOLLOW UP:  YULIYA Steiner  [address]  [facility] [address]  [phone]    Schedule an appointment as soon as possible for a visit       [facility], Emergency Dept  [address]  [facility] [id number] [phone]    If symptoms worsen      OUTPATIENT MEDICATIONS:  Discharge Medication List as of 5/12/2020 10:10 AM      START taking these medications    Details   fluconazole (DIFLUCAN) 200 MG Tab Take 1 Tab by mouth every day for 7 days., Disp-7 Tab, R-0, Normal      miconazole (MICOTIN) 2 % Cream Use twice daily as directed for 7 days., Disp-1 Tube, R-0, Normal           FINAL IMPRESSION  1. Acute vaginitis    2. Yeast vaginitis            Electronically signed by: Luca Butler M.D., 5/12/2020 10:21 AM

## 2020-05-12 NOTE — ED NOTES
Pelvic done by doctor and female nurse chaperone. Swabs sent to lab Patient tolerated procedure well.

## 2020-06-12 DIAGNOSIS — Z11.3 ROUTINE SCREENING FOR STI (SEXUALLY TRANSMITTED INFECTION): ICD-10-CM

## 2020-06-12 DIAGNOSIS — Z00.00 ROUTINE GENERAL MEDICAL EXAMINATION AT A HEALTH CARE FACILITY: ICD-10-CM

## 2020-06-12 DIAGNOSIS — R53.83 FATIGUE, UNSPECIFIED TYPE: ICD-10-CM

## 2020-06-16 ENCOUNTER — HOSPITAL ENCOUNTER (OUTPATIENT)
Dept: LAB | Facility: MEDICAL CENTER | Age: 20
End: 2020-06-16
Attending: NURSE PRACTITIONER
Payer: COMMERCIAL

## 2020-06-16 DIAGNOSIS — R53.83 FATIGUE, UNSPECIFIED TYPE: ICD-10-CM

## 2020-06-16 DIAGNOSIS — Z00.00 ROUTINE GENERAL MEDICAL EXAMINATION AT A HEALTH CARE FACILITY: ICD-10-CM

## 2020-06-16 DIAGNOSIS — Z11.3 ROUTINE SCREENING FOR STI (SEXUALLY TRANSMITTED INFECTION): ICD-10-CM

## 2020-06-16 LAB
ALBUMIN SERPL BCP-MCNC: 4.3 G/DL (ref 3.2–4.9)
ALBUMIN/GLOB SERPL: 1.3 G/DL
ALP SERPL-CCNC: 44 U/L (ref 30–99)
ALT SERPL-CCNC: 10 U/L (ref 2–50)
ANION GAP SERPL CALC-SCNC: 15 MMOL/L (ref 7–16)
AST SERPL-CCNC: 10 U/L (ref 12–45)
BILIRUB SERPL-MCNC: 1 MG/DL (ref 0.1–1.5)
BUN SERPL-MCNC: 7 MG/DL (ref 8–22)
CALCIUM SERPL-MCNC: 9 MG/DL (ref 8.5–10.5)
CHLORIDE SERPL-SCNC: 103 MMOL/L (ref 96–112)
CO2 SERPL-SCNC: 20 MMOL/L (ref 20–33)
CREAT SERPL-MCNC: 0.7 MG/DL (ref 0.5–1.4)
ERYTHROCYTE [DISTWIDTH] IN BLOOD BY AUTOMATED COUNT: 39.1 FL (ref 35.9–50)
GLOBULIN SER CALC-MCNC: 3.2 G/DL (ref 1.9–3.5)
GLUCOSE SERPL-MCNC: 91 MG/DL (ref 65–99)
HCT VFR BLD AUTO: 41 % (ref 37–47)
HGB BLD-MCNC: 14.1 G/DL (ref 12–16)
MCH RBC QN AUTO: 30 PG (ref 27–33)
MCHC RBC AUTO-ENTMCNC: 34.4 G/DL (ref 33.6–35)
MCV RBC AUTO: 87.2 FL (ref 81.4–97.8)
PLATELET # BLD AUTO: 380 K/UL (ref 164–446)
PMV BLD AUTO: 10.1 FL (ref 9–12.9)
POTASSIUM SERPL-SCNC: 3.2 MMOL/L (ref 3.6–5.5)
PROT SERPL-MCNC: 7.5 G/DL (ref 6–8.2)
RBC # BLD AUTO: 4.7 M/UL (ref 4.2–5.4)
SODIUM SERPL-SCNC: 138 MMOL/L (ref 135–145)
WBC # BLD AUTO: 7.6 K/UL (ref 4.8–10.8)

## 2020-06-16 PROCEDURE — 86780 TREPONEMA PALLIDUM: CPT

## 2020-06-16 PROCEDURE — 82306 VITAMIN D 25 HYDROXY: CPT

## 2020-06-16 PROCEDURE — 85027 COMPLETE CBC AUTOMATED: CPT

## 2020-06-16 PROCEDURE — 36415 COLL VENOUS BLD VENIPUNCTURE: CPT

## 2020-06-16 PROCEDURE — 87389 HIV-1 AG W/HIV-1&-2 AB AG IA: CPT

## 2020-06-16 PROCEDURE — 80053 COMPREHEN METABOLIC PANEL: CPT

## 2020-06-16 PROCEDURE — 84443 ASSAY THYROID STIM HORMONE: CPT

## 2020-06-17 LAB
25(OH)D3 SERPL-MCNC: 23 NG/ML (ref 30–100)
HIV 1+2 AB+HIV1 P24 AG SERPL QL IA: NORMAL
TREPONEMA PALLIDUM IGG+IGM AB [PRESENCE] IN SERUM OR PLASMA BY IMMUNOASSAY: NORMAL
TSH SERPL DL<=0.005 MIU/L-ACNC: 1.7 UIU/ML (ref 0.38–5.33)

## 2020-09-07 ENCOUNTER — HOSPITAL ENCOUNTER (EMERGENCY)
Facility: MEDICAL CENTER | Age: 20
End: 2020-09-07
Attending: EMERGENCY MEDICINE
Payer: COMMERCIAL

## 2020-09-07 ENCOUNTER — APPOINTMENT (OUTPATIENT)
Dept: RADIOLOGY | Facility: MEDICAL CENTER | Age: 20
End: 2020-09-07
Attending: EMERGENCY MEDICINE
Payer: COMMERCIAL

## 2020-09-07 VITALS
WEIGHT: 147.93 LBS | TEMPERATURE: 98.7 F | RESPIRATION RATE: 26 BRPM | HEIGHT: 68 IN | OXYGEN SATURATION: 97 % | DIASTOLIC BLOOD PRESSURE: 74 MMHG | HEART RATE: 87 BPM | SYSTOLIC BLOOD PRESSURE: 122 MMHG | BODY MASS INDEX: 22.42 KG/M2

## 2020-09-07 DIAGNOSIS — R10.2 PELVIC PAIN IN PREGNANCY: ICD-10-CM

## 2020-09-07 DIAGNOSIS — O26.899 PELVIC PAIN IN PREGNANCY: ICD-10-CM

## 2020-09-07 LAB
B-HCG SERPL-ACNC: 68.1 MIU/ML (ref 0–10)
BASOPHILS # BLD AUTO: 0.5 % (ref 0–1.8)
BASOPHILS # BLD: 0.03 K/UL (ref 0–0.12)
EOSINOPHIL # BLD AUTO: 0.04 K/UL (ref 0–0.51)
EOSINOPHIL NFR BLD: 0.7 % (ref 0–6.9)
ERYTHROCYTE [DISTWIDTH] IN BLOOD BY AUTOMATED COUNT: 35.6 FL (ref 35.9–50)
HCT VFR BLD AUTO: 38.3 % (ref 37–47)
HGB BLD-MCNC: 12.9 G/DL (ref 12–16)
IMM GRANULOCYTES # BLD AUTO: 0.01 K/UL (ref 0–0.11)
IMM GRANULOCYTES NFR BLD AUTO: 0.2 % (ref 0–0.9)
LYMPHOCYTES # BLD AUTO: 2.64 K/UL (ref 1–4.8)
LYMPHOCYTES NFR BLD: 43.2 % (ref 22–41)
MCH RBC QN AUTO: 28.3 PG (ref 27–33)
MCHC RBC AUTO-ENTMCNC: 33.7 G/DL (ref 33.6–35)
MCV RBC AUTO: 84 FL (ref 81.4–97.8)
MONOCYTES # BLD AUTO: 0.68 K/UL (ref 0–0.85)
MONOCYTES NFR BLD AUTO: 11.1 % (ref 0–13.4)
NEUTROPHILS # BLD AUTO: 2.71 K/UL (ref 2–7.15)
NEUTROPHILS NFR BLD: 44.3 % (ref 44–72)
NRBC # BLD AUTO: 0 K/UL
NRBC BLD-RTO: 0 /100 WBC
NUMBER OF RH DOSES IND 8505RD: NORMAL
PLATELET # BLD AUTO: 330 K/UL (ref 164–446)
PMV BLD AUTO: 9.4 FL (ref 9–12.9)
RBC # BLD AUTO: 4.56 M/UL (ref 4.2–5.4)
RH BLD: NORMAL
WBC # BLD AUTO: 6.1 K/UL (ref 4.8–10.8)

## 2020-09-07 PROCEDURE — 99284 EMERGENCY DEPT VISIT MOD MDM: CPT

## 2020-09-07 PROCEDURE — 85025 COMPLETE CBC W/AUTO DIFF WBC: CPT

## 2020-09-07 PROCEDURE — 86901 BLOOD TYPING SEROLOGIC RH(D): CPT

## 2020-09-07 PROCEDURE — 84702 CHORIONIC GONADOTROPIN TEST: CPT

## 2020-09-07 PROCEDURE — 76801 OB US < 14 WKS SINGLE FETUS: CPT

## 2020-09-07 ASSESSMENT — FIBROSIS 4 INDEX: FIB4 SCORE: 0.17

## 2020-09-08 NOTE — ED TRIAGE NOTES
"Chief Complaint   Patient presents with   • RLQ Pain     started last NOC   • Flank Pain     Right   • Pregnancy     pt reports may be pregnant by home test from 09/01/2020     /81   Pulse 100   Temp 37.1 °C (98.7 °F) (Temporal)   Resp 16   Ht 1.727 m (5' 8\")   Wt 67.1 kg (147 lb 14.9 oz)   LMP 08/08/2020   SpO2 98%   BMI 22.49 kg/m²     Covid Screen Negative.  "

## 2020-09-08 NOTE — DISCHARGE INSTRUCTIONS
Please follow-up with pregnancy center to have repeat hCG done on late Wednesday or early Thursday to ensure an appropriate doubling  You may still have an ectopic pregnancy.  This has not been completely ruled out but unlikely given that your pain is likely secondary to ovarian cyst that may be a corpus luteum cyst

## 2020-09-08 NOTE — ED PROVIDER NOTES
"ED Provider Note    CHIEF COMPLAINT  Chief Complaint   Patient presents with   • RLQ Pain     started last NOC   • Flank Pain     Right   • Pregnancy     pt reports may be pregnant by home test from 09/01/2020       HPI  Chyna Bond is a 19 y.o. female who presents with a report that she started having some right lower quadrant discomfort last night that seemed to radiate to her right flank but is not associated with any dysuria or frequency.  She states that her last menstrual period was about 10 August and that she thinks she is about for 5 weeks pregnant as she had a home pregnancy test that came back positive on September 1.  She is a G1, P0 and denies any vaginal bleeding or discharge and denies any other complaints    REVIEW OF SYSTEMS  See HPI for further details. All other systems are negative.     PAST MEDICAL HISTORY  Past Medical History:   Diagnosis Date   • Allergy    • Psychiatric disorder     depression, bipolar       FAMILY HISTORY  History reviewed. No pertinent family history.    SOCIAL HISTORY   reports that she has never smoked. She has never used smokeless tobacco. She reports that she does not drink alcohol or use drugs.    SURGICAL HISTORY  Past Surgical History:   Procedure Laterality Date   • MYRINGOTOMY         CURRENT MEDICATIONS  Home Medications    **Home medications have not yet been reviewed for this encounter**         ALLERGIES  Allergies   Allergen Reactions   • Nitroglycerin    • Sulfa Drugs Diarrhea and Nausea     Sick for weeks       PHYSICAL EXAM  VITAL SIGNS: /62   Pulse 99   Temp 37.1 °C (98.7 °F) (Temporal)   Resp 16   Ht 1.727 m (5' 8\")   Wt 67.1 kg (147 lb 14.9 oz)   LMP 08/08/2020   SpO2 98%   BMI 22.49 kg/m²    Constitutional: Well developed, Well nourished, No acute distress, Non-toxic appearance.   HENT: Normocephalic, Atraumatic, Bilateral external ears normal, Oropharynx is clear mucous membranes are moist. No oral exudates or nasal discharge. "   Eyes: Pupils are equal round and reactive, EOMI, Conjunctiva normal, No discharge.   Neck: Normal range of motion, No tenderness, Supple, No stridor. No meningismus.  Lymphatic: No lymphadenopathy noted.   Cardiovascular: Regular rate and rhythm without murmur rub or gallop.  Thorax & Lungs: Clear breath sounds bilaterally without wheezes, rhonchi or rales. There is no chest wall tenderness.   Abdomen: Soft with some tenderness in the right lower quadrant greater than right periovarian region non-distended. There is no rebound or guarding. No organomegaly is appreciated. Bowel sounds are normal.  Skin: Normal without rash.   Back: No CVA or spinal tenderness.   Extremities: Intact distal pulses, No edema, No tenderness, No cyanosis, No clubbing. Capillary refill is less than 2 seconds.  Musculoskeletal: Good range of motion in all major joints. No tenderness to palpation or major deformities noted.   Neurologic: Alert & oriented x 3, Normal motor function, Normal sensory function, No focal deficits noted. Reflexes are normal.  Psychiatric: Affect normal, Judgment normal, Mood normal. There is no suicidal ideation or patient reported hallucinations.       RADIOLOGY/PROCEDURES  US-OB 1ST TRIMESTER WITH TRANSVAGINAL (COMBO)   Final Result      1.  No intrauterine gestation is identified. Findings are nonspecific and may represent failed first trimester pregnancy, normal early pregnancy, or ectopic pregnancy. No additional findings to suggest ectopic pregnancy.      2.  Complex right ovarian cyst is likely physiologic.      3.  Small amount of nonspecific free pelvic fluid.            COURSE & MEDICAL DECISION MAKING  Pertinent Labs & Imaging studies reviewed. (See chart for details)  I was concerned about the possibility of ectopic pregnancy versus corpus luteum cyst or right ovarian cyst and also the possibility of nonspecific pelvic pain in pregnancy.      Appendicitis is also a possibility however unlikely given  that she does not have any fever or vomiting or leukocytosis or shift on CBC    Ultrasound reveals no evidence of intrauterine gestation but there is a complex right-sided ovarian cyst seen that may represent corpus luteum cyst and there is a small amount of nonspecific pelvic free fluid.    Laboratory evaluation reveals a beta-hCG of 68.  This is very early pregnancy or perhaps a failed first trimester pregnancy but she will need likely repeat imaging in addition to a repeat beta hCG test as an outpatient in 2 days    I have written a lab order to have her repeat hCG late Wednesday and follow-up with pregnancy test center on Thursday the patient is agreeable to this plan of care and will take Tylenol for discomfort as needed and return here if significant change in symptoms    FINAL IMPRESSION  1. Pelvic pain in pregnancy             Electronically signed by: Tom Valenzuela M.D., 9/7/2020 6:28 PM

## 2020-09-09 ENCOUNTER — HOSPITAL ENCOUNTER (OUTPATIENT)
Dept: LAB | Facility: MEDICAL CENTER | Age: 20
End: 2020-09-09
Payer: COMMERCIAL

## 2020-09-09 LAB — B-HCG SERPL-ACNC: 197.4 MIU/ML (ref 0–10)

## 2020-09-09 PROCEDURE — 84702 CHORIONIC GONADOTROPIN TEST: CPT

## 2020-09-09 PROCEDURE — 36415 COLL VENOUS BLD VENIPUNCTURE: CPT

## 2020-09-18 ENCOUNTER — APPOINTMENT (OUTPATIENT)
Dept: RADIOLOGY | Facility: MEDICAL CENTER | Age: 20
End: 2020-09-18
Attending: EMERGENCY MEDICINE
Payer: COMMERCIAL

## 2020-09-18 ENCOUNTER — HOSPITAL ENCOUNTER (EMERGENCY)
Facility: MEDICAL CENTER | Age: 20
End: 2020-09-18
Attending: EMERGENCY MEDICINE
Payer: COMMERCIAL

## 2020-09-18 VITALS
RESPIRATION RATE: 16 BRPM | WEIGHT: 151.68 LBS | OXYGEN SATURATION: 98 % | BODY MASS INDEX: 22.99 KG/M2 | TEMPERATURE: 98.7 F | DIASTOLIC BLOOD PRESSURE: 65 MMHG | HEART RATE: 82 BPM | SYSTOLIC BLOOD PRESSURE: 118 MMHG | HEIGHT: 68 IN

## 2020-09-18 DIAGNOSIS — O20.0 THREATENED MISCARRIAGE: ICD-10-CM

## 2020-09-18 LAB
ANION GAP SERPL CALC-SCNC: 10 MMOL/L (ref 7–16)
B-HCG SERPL-ACNC: 704.2 MIU/ML (ref 0–10)
BASOPHILS # BLD AUTO: 0.5 % (ref 0–1.8)
BASOPHILS # BLD: 0.03 K/UL (ref 0–0.12)
BUN SERPL-MCNC: 8 MG/DL (ref 8–22)
CALCIUM SERPL-MCNC: 8.9 MG/DL (ref 8.4–10.2)
CHLORIDE SERPL-SCNC: 106 MMOL/L (ref 96–112)
CO2 SERPL-SCNC: 24 MMOL/L (ref 20–33)
CREAT SERPL-MCNC: 0.59 MG/DL (ref 0.5–1.4)
EOSINOPHIL # BLD AUTO: 0.05 K/UL (ref 0–0.51)
EOSINOPHIL NFR BLD: 0.8 % (ref 0–6.9)
ERYTHROCYTE [DISTWIDTH] IN BLOOD BY AUTOMATED COUNT: 35.1 FL (ref 35.9–50)
GLUCOSE SERPL-MCNC: 105 MG/DL (ref 65–99)
HCT VFR BLD AUTO: 38.6 % (ref 37–47)
HGB BLD-MCNC: 13.2 G/DL (ref 12–16)
IMM GRANULOCYTES # BLD AUTO: 0.02 K/UL (ref 0–0.11)
IMM GRANULOCYTES NFR BLD AUTO: 0.3 % (ref 0–0.9)
LYMPHOCYTES # BLD AUTO: 2.17 K/UL (ref 1–4.8)
LYMPHOCYTES NFR BLD: 34.3 % (ref 22–41)
MCH RBC QN AUTO: 28.6 PG (ref 27–33)
MCHC RBC AUTO-ENTMCNC: 34.2 G/DL (ref 33.6–35)
MCV RBC AUTO: 83.7 FL (ref 81.4–97.8)
MONOCYTES # BLD AUTO: 0.57 K/UL (ref 0–0.85)
MONOCYTES NFR BLD AUTO: 9 % (ref 0–13.4)
NEUTROPHILS # BLD AUTO: 3.48 K/UL (ref 2–7.15)
NEUTROPHILS NFR BLD: 55.1 % (ref 44–72)
NRBC # BLD AUTO: 0 K/UL
NRBC BLD-RTO: 0 /100 WBC
NUMBER OF RH DOSES IND 8505RD: NORMAL
PLATELET # BLD AUTO: 327 K/UL (ref 164–446)
PMV BLD AUTO: 9.4 FL (ref 9–12.9)
POTASSIUM SERPL-SCNC: 3.8 MMOL/L (ref 3.6–5.5)
RBC # BLD AUTO: 4.61 M/UL (ref 4.2–5.4)
RH BLD: NORMAL
SODIUM SERPL-SCNC: 140 MMOL/L (ref 135–145)
WBC # BLD AUTO: 6.3 K/UL (ref 4.8–10.8)

## 2020-09-18 PROCEDURE — 86901 BLOOD TYPING SEROLOGIC RH(D): CPT

## 2020-09-18 PROCEDURE — 84702 CHORIONIC GONADOTROPIN TEST: CPT

## 2020-09-18 PROCEDURE — 99284 EMERGENCY DEPT VISIT MOD MDM: CPT

## 2020-09-18 PROCEDURE — 85025 COMPLETE CBC W/AUTO DIFF WBC: CPT

## 2020-09-18 PROCEDURE — 76801 OB US < 14 WKS SINGLE FETUS: CPT

## 2020-09-18 PROCEDURE — 80048 BASIC METABOLIC PNL TOTAL CA: CPT

## 2020-09-18 ASSESSMENT — FIBROSIS 4 INDEX: FIB4 SCORE: 0.2

## 2020-09-19 NOTE — ED NOTES
Pt given discharge instructions. RN answered questions. VSS. Pt ambulated steadily out to [facility].

## 2020-09-19 NOTE — ED TRIAGE NOTES
Pt presents with significant other for vaginal bleeding that began 30 min ago. Pt is approx 6 weeks PRG. Also reports intermittent abd pain began 2 weeks ago and waiting to get in with an OBGYN. No fever, N,V. Pt A&Ox4 and ambulatory.     Patient masked. No respiratory symptoms, no recent travel, denies known COVID exposure.

## 2020-09-19 NOTE — ED PROVIDER NOTES
"ED Provider Note    ER Provider Note         CHIEF COMPLAINT  Chief Complaint   Patient presents with   • Vaginal Bleeding       HPI  Chyna Bond is a 20 y.o. female who presents to the Emergency Department with some mild amount of vaginal bleeding onset about 30 minutes ago.  The patient also mentions she has some continued right pelvic pain where she was diagnosed with a cyst.  She denies any nausea or vomiting.  She denies any abdominal pains.  She denies any back pain or neck pain.  She denies any dysuria.    REVIEW OF SYSTEMS  See HPI for further details. All other systems are negative.     PAST MEDICAL HISTORY   has a past medical history of Allergy and Psychiatric disorder.    SURGICAL HISTORY   has a past surgical history that includes myringotomy.    SOCIAL HISTORY  Social History     Tobacco Use   • Smoking status: Never Smoker   • Smokeless tobacco: Never Used   Substance Use Topics   • Alcohol use: No   • Drug use: No      Social History     Substance and Sexual Activity   Drug Use No       FAMILY HISTORY  History reviewed. No pertinent family history.    CURRENT MEDICATIONS  Home Medications     Reviewed by Emma Petersen R.N. (Registered Nurse) on 09/18/20 at 2903  Med List Status: Not Addressed   Medication Last Dose Status   albuterol 108 (90 Base) MCG/ACT Aero Soln inhalation aerosol  Active   DAYSEE 0.15-0.03 &0.01 MG Tab  Active   miconazole (MICOTIN) 2 % Cream  Active                ALLERGIES  Allergies   Allergen Reactions   • Nitroglycerin    • Sulfa Drugs Diarrhea and Nausea     Sick for weeks       PHYSICAL EXAM  VITAL SIGNS: /65   Pulse 82   Temp 37.1 °C (98.7 °F) (Temporal)   Resp 16   Ht 1.727 m (5' 8\")   Wt 68.8 kg (151 lb 10.8 oz)   LMP 08/08/2020   SpO2 98%   BMI 23.06 kg/m²      Constitutional: Alert in no apparent distress.  HENT: No signs of trauma, Bilateral external ears normal, Nose normal.   Eyes: Pupils are equal and reactive, Conjunctiva normal, " Non-icteric.   Neck: Normal range of motion, No tenderness, Supple, No stridor.   Lymphatic: No lymphadenopathy noted.   Cardiovascular: Regular rate and rhythm, nondisplaced PMI  Thorax & Lungs: No respiratory distress,  No chest tenderness.   Abdomen: Bowel sounds normal, Soft, mild right pelvic pain tenderness, No masses, No pulsatile masses. No peritoneal signs.  Skin: Warm, Dry, No erythema, No rash.   Back: No bony tenderness, No CVA tenderness.   Extremities: Intact distal pulses, No edema, No tenderness, No cyanosis.  Musculoskeletal: Good range of motion in all major joints. No tenderness to palpation or major deformities noted.   Neurologic: Alert , Normal motor function, Normal sensory function, No focal deficits noted.   Psychiatric: Affect normal, Judgment normal, Mood normal.     DIAGNOSTIC STUDIES / PROCEDURES           LABS  Labs Reviewed   CBC WITH DIFFERENTIAL - Abnormal; Notable for the following components:       Result Value    RDW 35.1 (*)     All other components within normal limits   BASIC METABOLIC PANEL - Abnormal; Notable for the following components:    Glucose 105 (*)     All other components within normal limits   HCG QUANTITATIVE - Abnormal; Notable for the following components:    Bhcg 704.2 (*)     All other components within normal limits   RH TYPE FOR RHOGAM FROM E.D.    Narrative:     Print Consent?->No   ESTIMATED GFR       All labs reviewed by me.    RADIOLOGY  US-OB 1ST TRIMESTER WITH TRANSVAGINAL (COMBO)   Final Result      1.  Intrauterine gestational sac but without visualized fetal parts or yolk sac. By sac diameter the gestational age is estimated at four weeks six days, corresponding to YAMINI of 5/22/2021.   2.  Partly septated right ovarian cyst.          The radiologist's interpretation of all radiological studies have been reviewed by me.    COURSE & MEDICAL DECISION MAKING  Pertinent Labs & Imaging studies reviewed. (See chart for details)    This is a 20 y.o. female  that presents with right pelvic pain with vaginal bleeding.  The patient says that she feels it is lighter than normal..  Is likely a threatened miscarriage.  I will get an Rh as well as hemoglobin level.  In addition I will get an hCG and ultrasound of the patient.     11:19 PM - Patient seen and examined at bedside.     Patient was found to have what appears to be an IUP but still very early.  It is likely at 4 weeks 6 days.  hCG is 704.  We will have the patient follow-up with women health services.  She does have an appointment in the second week of October.  I am going to attempt to make this much earlier.    There is no significant pain in the right lower quadrant.  I did speak to her about potential appendicitis and this is very unlikely but given strict return precautions.    FINAL IMPRESSION  1. Threatened miscarriage              Electronically signed by: Gerardo Woods M.D., 9/18/2020

## 2020-10-22 ENCOUNTER — NON-PROVIDER VISIT (OUTPATIENT)
Dept: OCCUPATIONAL MEDICINE | Facility: CLINIC | Age: 20
End: 2020-10-22
Payer: COMMERCIAL

## 2020-10-22 DIAGNOSIS — Z23 NEED FOR VACCINATION: ICD-10-CM

## 2020-10-22 PROCEDURE — 90686 IIV4 VACC NO PRSV 0.5 ML IM: CPT | Performed by: NURSE PRACTITIONER

## 2020-12-16 DIAGNOSIS — Z23 NEED FOR VACCINATION: ICD-10-CM

## 2020-12-17 ENCOUNTER — IMMUNIZATION (OUTPATIENT)
Dept: FAMILY PLANNING/WOMEN'S HEALTH CLINIC | Facility: IMMUNIZATION CENTER | Age: 20
End: 2020-12-17
Attending: FAMILY MEDICINE
Payer: COMMERCIAL

## 2020-12-17 DIAGNOSIS — Z23 NEED FOR VACCINATION: ICD-10-CM

## 2020-12-17 DIAGNOSIS — Z23 ENCOUNTER FOR VACCINATION: Primary | ICD-10-CM

## 2020-12-17 PROCEDURE — 0001A PFIZER SARS-COV-2 VACCINE: CPT

## 2020-12-17 PROCEDURE — 91300 PFIZER SARS-COV-2 VACCINE: CPT

## 2021-01-08 ENCOUNTER — HOSPITAL ENCOUNTER (OUTPATIENT)
Facility: MEDICAL CENTER | Age: 21
End: 2021-01-08
Attending: NURSE PRACTITIONER
Payer: COMMERCIAL

## 2021-01-08 ENCOUNTER — IMMUNIZATION (OUTPATIENT)
Dept: FAMILY PLANNING/WOMEN'S HEALTH CLINIC | Facility: IMMUNIZATION CENTER | Age: 21
End: 2021-01-08
Attending: FAMILY MEDICINE
Payer: COMMERCIAL

## 2021-01-08 DIAGNOSIS — Z23 ENCOUNTER FOR VACCINATION: Primary | ICD-10-CM

## 2021-01-08 PROCEDURE — 0002A PFIZER SARS-COV-2 VACCINE: CPT | Performed by: FAMILY MEDICINE

## 2021-01-08 PROCEDURE — U0003 INFECTIOUS AGENT DETECTION BY NUCLEIC ACID (DNA OR RNA); SEVERE ACUTE RESPIRATORY SYNDROME CORONAVIRUS 2 (SARS-COV-2) (CORONAVIRUS DISEASE [COVID-19]), AMPLIFIED PROBE TECHNIQUE, MAKING USE OF HIGH THROUGHPUT TECHNOLOGIES AS DESCRIBED BY CMS-2020-01-R: HCPCS

## 2021-01-08 PROCEDURE — 91300 PFIZER SARS-COV-2 VACCINE: CPT | Performed by: FAMILY MEDICINE

## 2021-01-08 PROCEDURE — U0005 INFEC AGEN DETEC AMPLI PROBE: HCPCS

## 2021-01-10 LAB
COVID ORDER STATUS COVID19: NORMAL
SARS-COV-2 RNA RESP QL NAA+PROBE: NOTDETECTED
SPECIMEN SOURCE: NORMAL

## 2021-06-04 ENCOUNTER — HOSPITAL ENCOUNTER (OUTPATIENT)
Dept: LAB | Facility: MEDICAL CENTER | Age: 21
End: 2021-06-04
Attending: OBSTETRICS & GYNECOLOGY
Payer: COMMERCIAL

## 2021-06-04 LAB
ABO GROUP BLD: NORMAL
BASOPHILS # BLD AUTO: 0.3 % (ref 0–1.8)
BASOPHILS # BLD: 0.02 K/UL (ref 0–0.12)
BLD GP AB SCN SERPL QL: NORMAL
EOSINOPHIL # BLD AUTO: 0.04 K/UL (ref 0–0.51)
EOSINOPHIL NFR BLD: 0.6 % (ref 0–6.9)
ERYTHROCYTE [DISTWIDTH] IN BLOOD BY AUTOMATED COUNT: 38.4 FL (ref 35.9–50)
HBV SURFACE AG SER QL: NORMAL
HCT VFR BLD AUTO: 38 % (ref 37–47)
HGB BLD-MCNC: 12.9 G/DL (ref 12–16)
HIV 1+2 AB+HIV1 P24 AG SERPL QL IA: NORMAL
IMM GRANULOCYTES # BLD AUTO: 0.02 K/UL (ref 0–0.11)
IMM GRANULOCYTES NFR BLD AUTO: 0.3 % (ref 0–0.9)
LYMPHOCYTES # BLD AUTO: 1.9 K/UL (ref 1–4.8)
LYMPHOCYTES NFR BLD: 26.2 % (ref 22–41)
MCH RBC QN AUTO: 29.8 PG (ref 27–33)
MCHC RBC AUTO-ENTMCNC: 33.9 G/DL (ref 33.6–35)
MCV RBC AUTO: 87.8 FL (ref 81.4–97.8)
MONOCYTES # BLD AUTO: 0.53 K/UL (ref 0–0.85)
MONOCYTES NFR BLD AUTO: 7.3 % (ref 0–13.4)
NEUTROPHILS # BLD AUTO: 4.74 K/UL (ref 2–7.15)
NEUTROPHILS NFR BLD: 65.3 % (ref 44–72)
NRBC # BLD AUTO: 0 K/UL
NRBC BLD-RTO: 0 /100 WBC
PLATELET # BLD AUTO: 299 K/UL (ref 164–446)
PMV BLD AUTO: 10.4 FL (ref 9–12.9)
RBC # BLD AUTO: 4.33 M/UL (ref 4.2–5.4)
RH BLD: NORMAL
RUBV AB SER QL: 276 IU/ML
TREPONEMA PALLIDUM IGG+IGM AB [PRESENCE] IN SERUM OR PLASMA BY IMMUNOASSAY: NORMAL
WBC # BLD AUTO: 7.3 K/UL (ref 4.8–10.8)

## 2021-06-04 PROCEDURE — 86850 RBC ANTIBODY SCREEN: CPT

## 2021-06-04 PROCEDURE — 86901 BLOOD TYPING SEROLOGIC RH(D): CPT

## 2021-06-04 PROCEDURE — 86900 BLOOD TYPING SEROLOGIC ABO: CPT

## 2021-06-04 PROCEDURE — 85025 COMPLETE CBC W/AUTO DIFF WBC: CPT

## 2021-06-04 PROCEDURE — 36415 COLL VENOUS BLD VENIPUNCTURE: CPT

## 2021-06-04 PROCEDURE — 87077 CULTURE AEROBIC IDENTIFY: CPT

## 2021-06-04 PROCEDURE — 87389 HIV-1 AG W/HIV-1&-2 AB AG IA: CPT

## 2021-06-04 PROCEDURE — 86762 RUBELLA ANTIBODY: CPT

## 2021-06-04 PROCEDURE — 86780 TREPONEMA PALLIDUM: CPT

## 2021-06-04 PROCEDURE — 87340 HEPATITIS B SURFACE AG IA: CPT

## 2021-06-04 PROCEDURE — 87086 URINE CULTURE/COLONY COUNT: CPT

## 2021-06-06 LAB
BACTERIA UR CULT: ABNORMAL
BACTERIA UR CULT: ABNORMAL
SIGNIFICANT IND 70042: ABNORMAL
SITE SITE: ABNORMAL
SOURCE SOURCE: ABNORMAL

## 2021-06-15 ENCOUNTER — HOSPITAL ENCOUNTER (OUTPATIENT)
Dept: LAB | Facility: MEDICAL CENTER | Age: 21
End: 2021-06-15
Attending: OBSTETRICS & GYNECOLOGY
Payer: COMMERCIAL

## 2021-06-15 PROCEDURE — 81508 FTL CGEN ABNOR TWO PROTEINS: CPT

## 2021-06-15 PROCEDURE — 36415 COLL VENOUS BLD VENIPUNCTURE: CPT

## 2021-06-17 LAB
# FETUSES US: NORMAL
AGE - REPORTED: 22.3 YR
COLLECT DATE: NORMAL
CURRENT SMOKER: NO
FET CRL US.MEAS: 54.3 MM
FET CRL US.MEAS: NORMAL MM
FET NUCHAL FOLD MOM THICKNESS US.MEAS: 0.71
FET NUCHAL FOLD MOM THICKNESS US.MEAS: NORMAL
FET NUCHAL FOLD THICKNESS US.MEAS: 1 MM
GA: NORMAL WK
HCG MOM SERPL: 0.91
HCG SERPL-ACNC: NORMAL IU/L
HX OF HEREDITARY DISORDERS: NO
INTEGRATED SCN PATIENT-IMP: NORMAL
NUCHAL TRANSLUCENCY (NT), TWIN B Q0252: NORMAL MM
PAPP-A MOM SERPL: 0.37
PAPP-A SERPL-MCNC: 395.3 NG/ML
PATHOLOGY STUDY: NORMAL
SONOGRAPHER NAME: NORMAL
SONOGRAPHER: NORMAL
SPECIMEN DRAWN SERPL: NORMAL
US DATE: NORMAL

## 2021-07-24 ENCOUNTER — HOSPITAL ENCOUNTER (OUTPATIENT)
Dept: LAB | Facility: MEDICAL CENTER | Age: 21
End: 2021-07-24
Attending: OBSTETRICS & GYNECOLOGY
Payer: COMMERCIAL

## 2021-07-24 PROCEDURE — 36415 COLL VENOUS BLD VENIPUNCTURE: CPT

## 2021-07-24 PROCEDURE — 81511 FTL CGEN ABNOR FOUR ANAL: CPT

## 2021-07-27 LAB
# FETUSES US: NORMAL
AFP MOM SERPL: 1.01
AFP SERPL-MCNC: 48 NG/ML
AGE - REPORTED: 22.3 YR
CURRENT SMOKER: NO
FAMILY MEMBER DISEASES HX: NO
FET CRL US.MEAS: 54.3 MM
FET CRL US.MEAS: NORMAL MM
FET NUCHAL FOLD MOM THICKNESS US.MEAS: 0.71
FET NUCHAL FOLD MOM THICKNESS US.MEAS: NORMAL
FET NUCHAL FOLD THICKNESS US.MEAS: 1 MM
GA: NORMAL WK
HCG MOM SERPL: 0.97
HCG SERPL-ACNC: NORMAL IU/L
HX OF HEREDITARY DISORDERS: NO
IDDM PATIENT QL: NO
INHIBIN A MOM SERPL: 0.73
INHIBIN A SERPL-MCNC: 109 PG/ML
INTEGRATED SCN PATIENT-IMP: NORMAL
NUCHAL TRANSLUCENCY (NT), TWIN B Q0252: NORMAL MM
PAPP-A MOM SERPL: 0.37
PAPP-A SERPL-MCNC: 395.3 NG/ML
PATHOLOGY STUDY: NORMAL
SONOGRAPHER NAME: NORMAL
SONOGRAPHER: NORMAL
SPECIMEN DRAWN SERPL: NORMAL
U ESTRIOL MOM SERPL: 1.34
U ESTRIOL SERPL-MCNC: 2.47 NG/ML
US DATE: NORMAL

## 2021-08-16 ENCOUNTER — OFFICE VISIT (OUTPATIENT)
Dept: URGENT CARE | Facility: PHYSICIAN GROUP | Age: 21
End: 2021-08-16
Payer: COMMERCIAL

## 2021-08-16 VITALS
HEART RATE: 86 BPM | HEIGHT: 68 IN | WEIGHT: 165 LBS | RESPIRATION RATE: 16 BRPM | TEMPERATURE: 99.4 F | OXYGEN SATURATION: 97 % | BODY MASS INDEX: 25.01 KG/M2 | SYSTOLIC BLOOD PRESSURE: 120 MMHG | DIASTOLIC BLOOD PRESSURE: 78 MMHG

## 2021-08-16 DIAGNOSIS — R22.32 LOCALIZED SWELLING OF LEFT THUMB: ICD-10-CM

## 2021-08-16 PROBLEM — Z34.02 ENCOUNTER FOR SUPERVISION OF NORMAL FIRST PREGNANCY IN SECOND TRIMESTER: Status: ACTIVE | Noted: 2021-06-15

## 2021-08-16 PROBLEM — M25.552 PAIN OF LEFT HIP JOINT: Status: ACTIVE | Noted: 2021-06-15

## 2021-08-16 PROCEDURE — 99213 OFFICE O/P EST LOW 20 MIN: CPT | Performed by: PHYSICIAN ASSISTANT

## 2021-08-16 RX ORDER — PREDNISONE 5 MG/1
5 TABLET ORAL DAILY
Qty: 5 TABLET | Refills: 0 | Status: SHIPPED | OUTPATIENT
Start: 2021-08-16 | End: 2021-08-21

## 2021-08-16 ASSESSMENT — ENCOUNTER SYMPTOMS
TINGLING: 0
SENSORY CHANGE: 0
WEAKNESS: 0

## 2021-08-16 ASSESSMENT — FIBROSIS 4 INDEX: FIB4 SCORE: 0.22

## 2021-08-16 NOTE — PROGRESS NOTES
"Subjective:   Chyna Bond is a 20 y.o. female who presents for Hand Swelling (L hand swelling, x1 week )      HPI  20 y.o. female at 22 weeks gestation presents to urgent care with new problem to provider of localized swelling to distal joint of left thumb.   History of chronic injury of left thumb with intermittent swelling of joint. No new injury. Pain is worse with repetitive motion and movement of thumb. She has been taking Tylenol with minimal relief.   Patient is right hand dominant.   Denies other associated aggravating or alleviating factors.     Review of Systems   Musculoskeletal:        Left thumb pain/swelling   Skin: Negative for itching and rash.   Neurological: Negative for tingling, sensory change and weakness.       Patient Active Problem List   Diagnosis   • POTS (postural orthostatic tachycardia syndrome)   • Mild intermittent asthma without complication   • Encounter for supervision of normal first pregnancy in second trimester   • Pain of left hip joint     Past Surgical History:   Procedure Laterality Date   • MYRINGOTOMY       Social History     Tobacco Use   • Smoking status: Never Smoker   • Smokeless tobacco: Never Used   Vaping Use   • Vaping Use: Never used   Substance Use Topics   • Alcohol use: No   • Drug use: No      History reviewed. No pertinent family history.   (Allergies, Medications, & Tobacco/Substance Use were reconciled by the Medical Assistant and reviewed by myself. The family history is prepopulated)     Objective:     /78 (BP Location: Right arm, Patient Position: Sitting, BP Cuff Size: Adult)   Pulse 86   Temp 37.4 °C (99.4 °F) (Temporal)   Resp 16   Ht 1.727 m (5' 8\")   Wt 74.8 kg (165 lb)   LMP 08/08/2020   SpO2 97%   BMI 25.09 kg/m²     Physical Exam  Vitals reviewed.   Constitutional:       Appearance: Normal appearance. She is well-developed.   HENT:      Head: Normocephalic and atraumatic.   Eyes:      Conjunctiva/sclera: Conjunctivae " normal.   Cardiovascular:      Rate and Rhythm: Normal rate.   Pulmonary:      Effort: Pulmonary effort is normal. No respiratory distress.   Musculoskeletal:        Hands:       Cervical back: Normal range of motion and neck supple.   Skin:     General: Skin is warm and dry.      Findings: No erythema.   Neurological:      General: No focal deficit present.      Mental Status: She is alert and oriented to person, place, and time.      Sensory: No sensory deficit.      Motor: No weakness.   Psychiatric:         Mood and Affect: Mood normal.         Behavior: Behavior normal.         Thought Content: Thought content normal.         Judgment: Judgment normal.         Assessment/Plan:     1. Localized swelling of left thumb  predniSONE (DELTASONE) 5 MG Tab     Recommend 5 day course of low dose steroids. Patient should consult her OB prior to starting prednisone course. Avoid NSAIDs during pregnancy. Ice, rest. Continue to wear thumb splint as needed for comfort and support. No indication for xray imaging at this time.     Differential diagnosis, natural history, supportive care, and indications for immediate follow-up discussed.    Advised the patient to follow-up with the primary care physician for recheck, reevaluation, and consideration of further management.  Patient verbalized understanding of treatment plan and has no further questions regarding care.     I personally reviewed prior external notes and test results pertinent to today's visit.   Please note that this dictation was created using voice recognition software. I have made a reasonable attempt to correct obvious errors, but I expect that there are errors of grammar and possibly content that I did not discover before finalizing the note.    This note was electronically signed by Gianna Mcgraw PA-C

## 2021-09-23 ENCOUNTER — HOSPITAL ENCOUNTER (OUTPATIENT)
Dept: LAB | Facility: MEDICAL CENTER | Age: 21
End: 2021-09-23
Attending: OBSTETRICS & GYNECOLOGY
Payer: COMMERCIAL

## 2021-09-23 LAB
ERYTHROCYTE [DISTWIDTH] IN BLOOD BY AUTOMATED COUNT: 41.1 FL (ref 35.9–50)
GLUCOSE 1H P 50 G GLC PO SERPL-MCNC: 123 MG/DL (ref 70–139)
HCT VFR BLD AUTO: 34.2 % (ref 37–47)
HGB BLD-MCNC: 11.2 G/DL (ref 12–16)
MCH RBC QN AUTO: 29.2 PG (ref 27–33)
MCHC RBC AUTO-ENTMCNC: 32.7 G/DL (ref 33.6–35)
MCV RBC AUTO: 89.3 FL (ref 81.4–97.8)
PLATELET # BLD AUTO: 263 K/UL (ref 164–446)
PMV BLD AUTO: 10.2 FL (ref 9–12.9)
RBC # BLD AUTO: 3.83 M/UL (ref 4.2–5.4)
TREPONEMA PALLIDUM IGG+IGM AB [PRESENCE] IN SERUM OR PLASMA BY IMMUNOASSAY: NORMAL
WBC # BLD AUTO: 8.9 K/UL (ref 4.8–10.8)

## 2021-09-23 PROCEDURE — 36415 COLL VENOUS BLD VENIPUNCTURE: CPT

## 2021-09-23 PROCEDURE — 85027 COMPLETE CBC AUTOMATED: CPT

## 2021-09-23 PROCEDURE — 86780 TREPONEMA PALLIDUM: CPT

## 2021-09-23 PROCEDURE — 82950 GLUCOSE TEST: CPT

## 2021-09-24 ENCOUNTER — IMMUNIZATION (OUTPATIENT)
Dept: OCCUPATIONAL MEDICINE | Facility: CLINIC | Age: 21
End: 2021-09-24
Payer: COMMERCIAL

## 2021-09-24 DIAGNOSIS — Z23 NEED FOR VACCINATION: Primary | ICD-10-CM

## 2021-09-24 PROCEDURE — 90686 IIV4 VACC NO PRSV 0.5 ML IM: CPT | Performed by: NURSE PRACTITIONER

## 2021-10-01 ENCOUNTER — IMMUNIZATION (OUTPATIENT)
Dept: OCCUPATIONAL MEDICINE | Facility: CLINIC | Age: 21
End: 2021-10-01
Payer: COMMERCIAL

## 2021-10-01 DIAGNOSIS — Z23 ENCOUNTER FOR VACCINATION: Primary | ICD-10-CM

## 2021-10-01 PROCEDURE — 0003A PFIZER SARS-COV-2 VACCINE: CPT | Performed by: INTERNAL MEDICINE

## 2021-10-01 PROCEDURE — 91300 PFIZER SARS-COV-2 VACCINE: CPT | Performed by: INTERNAL MEDICINE

## 2021-10-07 ENCOUNTER — HOSPITAL ENCOUNTER (EMERGENCY)
Facility: MEDICAL CENTER | Age: 21
End: 2021-10-07
Attending: OBSTETRICS & GYNECOLOGY | Admitting: OBSTETRICS & GYNECOLOGY
Payer: COMMERCIAL

## 2021-10-07 VITALS
BODY MASS INDEX: 27.28 KG/M2 | DIASTOLIC BLOOD PRESSURE: 72 MMHG | RESPIRATION RATE: 16 BRPM | HEIGHT: 68 IN | TEMPERATURE: 98.7 F | SYSTOLIC BLOOD PRESSURE: 120 MMHG | OXYGEN SATURATION: 98 % | WEIGHT: 180 LBS | HEART RATE: 98 BPM

## 2021-10-07 LAB
APPEARANCE UR: ABNORMAL
COLOR UR AUTO: ABNORMAL
GLUCOSE UR QL STRIP.AUTO: NEGATIVE MG/DL
KETONES UR QL STRIP.AUTO: NEGATIVE MG/DL
LEUKOCYTE ESTERASE UR QL STRIP.AUTO: ABNORMAL
NITRITE UR QL STRIP.AUTO: NEGATIVE
PH UR STRIP.AUTO: 6.5 [PH] (ref 5–8)
PROT UR QL STRIP: 30 MG/DL
RBC UR QL AUTO: NEGATIVE
SP GR UR STRIP.AUTO: 1.02 (ref 1–1.03)

## 2021-10-07 PROCEDURE — 81002 URINALYSIS NONAUTO W/O SCOPE: CPT

## 2021-10-07 PROCEDURE — 700105 HCHG RX REV CODE 258: Performed by: OBSTETRICS & GYNECOLOGY

## 2021-10-07 PROCEDURE — 302449 STATCHG TRIAGE ONLY (STATISTIC)

## 2021-10-07 PROCEDURE — 96374 THER/PROPH/DIAG INJ IV PUSH: CPT

## 2021-10-07 PROCEDURE — 700111 HCHG RX REV CODE 636 W/ 250 OVERRIDE (IP)

## 2021-10-07 PROCEDURE — 87086 URINE CULTURE/COLONY COUNT: CPT

## 2021-10-07 RX ORDER — ONDANSETRON 2 MG/ML
4 INJECTION INTRAMUSCULAR; INTRAVENOUS EVERY 4 HOURS PRN
Status: DISCONTINUED | OUTPATIENT
Start: 2021-10-07 | End: 2021-10-07 | Stop reason: HOSPADM

## 2021-10-07 RX ORDER — SODIUM CHLORIDE, SODIUM LACTATE, POTASSIUM CHLORIDE, CALCIUM CHLORIDE 600; 310; 30; 20 MG/100ML; MG/100ML; MG/100ML; MG/100ML
INJECTION, SOLUTION INTRAVENOUS CONTINUOUS
Status: DISCONTINUED | OUTPATIENT
Start: 2021-10-07 | End: 2021-10-07 | Stop reason: HOSPADM

## 2021-10-07 RX ORDER — ONDANSETRON 2 MG/ML
INJECTION INTRAMUSCULAR; INTRAVENOUS
Status: COMPLETED
Start: 2021-10-07 | End: 2021-10-07

## 2021-10-07 RX ADMIN — ONDANSETRON 4 MG: 2 INJECTION INTRAMUSCULAR; INTRAVENOUS at 18:38

## 2021-10-07 RX ADMIN — SODIUM CHLORIDE, POTASSIUM CHLORIDE, SODIUM LACTATE AND CALCIUM CHLORIDE: 600; 310; 30; 20 INJECTION, SOLUTION INTRAVENOUS at 18:35

## 2021-10-07 ASSESSMENT — PAIN SCALES - GENERAL: PAINLEVEL: 5 - MODERATE PAIN

## 2021-10-07 ASSESSMENT — FIBROSIS 4 INDEX: FIB4 SCORE: 0.26

## 2021-10-08 NOTE — PROGRESS NOTES
"22 y/o, , EDC 21, EGA 29.3. Here to room 232 with . C/o left sided abdominal cramping pain described as \"runner's pain\" Pt states it is sharp shooting pain and comes and goes. Pt states she is not having the pain at this time. EFM/toco applied, patient denies lof/bleeding, reports positive fm. VSS. Pt also reports N/V for 1 week on and off and bouts of diarrhea and feels dehydrated. Urine dipped.     Call placed to Dr. Pineda. Orders received. SVE as noted. LR and zofran given. See MAR.     1900- Report to NOC shift RN.   "

## 2021-10-08 NOTE — PROGRESS NOTES
1900) Report received from Page NOLASCO RN, POC discussed, assumed care of patient at this time  ) Patient states she feels much better, rating abdominal pain 2/10 and nausea has dissipated. Telephone report to Dr. Pineda, orders received to discharge patient, patient updated concerning POC,  verbalizes understanding and agrees with plan.   ) Discharge instructions given including  labor precautions, UC's, ROM, VB, abn FM, when to return to L&D, f/u with Dr. Suggs on 10/15, pelvic rest and modified bedrest. Questions answered at length. Pt and FOB verbalized understanding and agreement with POC and discharge. Discharge instructions signed.   ) Patient ambulated out of unit in stable condition with FOB at her side

## 2021-10-08 NOTE — DISCHARGE INSTRUCTIONS
General Instructions:  · If you think you are in labor, time contractions (lying on your left side) from the beginning of one contraction to the beginning of the next contraction for at least one hour.  · Increase fluid intake: you should consume 10-12 8 oz glasses of non-caffeinated fluid per day.  · Report any pressure or burning on urination to your physician.  · Monitor fetal movement: If you notice an absence or decrease in fetal movement, drink a large glass of water and rest on your side.  If there is no increase in movement, call your physician or go to the hospital for further evaluation.  · Report any sudden, sharp abdominal pain.  · Report any bleeding.  Spotting or pinkish discharge is normal after vaginal exam.  You may also spot after sexual intercourse.    Pre-term Labor (<37 weeks):  Call your physician or return to the hospital if:  · You have painless regular contractions more than 4 in one hour.  · Your water breaks (remember time and color).  · You have menstrual-like cramps, a low dull backache or pressure in your pelvis or back.  · Your baby does not move enough to complete the daily kick count (10 movements in 2 hours).  · Your baby moves much less often than on the days before or you have not felt your baby move all day.  · Please review the MEDICATION LIST section of your AFTER VISIT SUMMARY document.  · Take your medication as prescribed      Round Ligament Pain    The round ligament is a cord of muscle and tissue that helps support the uterus. It can become a source of pain during pregnancy if it becomes stretched or twisted as the baby grows. The pain usually begins in the second trimester (13-28 weeks) of pregnancy, and it can come and go until the baby is delivered. It is not a serious problem, and it does not cause harm to the baby.  Round ligament pain is usually a short, sharp, and pinching pain, but it can also be a dull, lingering, and aching pain. The pain is felt in the lower  side of the abdomen or in the groin. It usually starts deep in the groin and moves up to the outside of the hip area. The pain may occur when you:  · Suddenly change position, such as quickly going from a sitting to standing position.  · Roll over in bed.  · Cough or sneeze.  · Do physical activity.  Follow these instructions at home:    · Watch your condition for any changes.  · When the pain starts, relax. Then try any of these methods to help with the pain:  ? Sitting down.  ? Flexing your knees up to your abdomen.  ? Lying on your side with one pillow under your abdomen and another pillow between your legs.  ? Sitting in a warm bath for 15-20 minutes or until the pain goes away.  · Take over-the-counter and prescription medicines only as told by your health care provider.  · Move slowly when you sit down or stand up.  · Avoid long walks if they cause pain.  · Stop or reduce your physical activities if they cause pain.  · Keep all follow-up visits as told by your health care provider. This is important.  Contact a health care provider if:  · Your pain does not go away with treatment.  · You feel pain in your back that you did not have before.  · Your medicine is not helping.  Get help right away if:  · You have a fever or chills.  · You develop uterine contractions.  · You have vaginal bleeding.  · You have nausea or vomiting.  · You have diarrhea.  · You have pain when you urinate.  Summary  · Round ligament pain is felt in the lower abdomen or groin. It is usually a short, sharp, and pinching pain. It can also be a dull, lingering, and aching pain.  · This pain usually begins in the second trimester (13-28 weeks). It occurs because the uterus is stretching with the growing baby, and it is not harmful to the baby.  · You may notice the pain when you suddenly change position, when you cough or sneeze, or during physical activity.  · Relaxing, flexing your knees to your abdomen, lying on one side, or taking a warm  bath may help to get rid of the pain.  · Get help from your health care provider if the pain does not go away or if you have vaginal bleeding, nausea, vomiting, diarrhea, or painful urination.  This information is not intended to replace advice given to you by your health care provider. Make sure you discuss any questions you have with your health care provider.  Document Released: 09/26/2009 Document Revised: 06/05/2019 Document Reviewed: 06/05/2019  [employer] Patient Education © 2020 [employer]    Urinary Tract Infection:  · Increase your fluid intake.  Avoid coffee, tea, devin, and other carbonated drinks.  · Please review the MEDICATION LIST section of your AFTER VISIT SUMMARY document.  · Take medications as prescribed.  · Take Medication until it is gone even if you feel better.  · Notify your physician if there is no improvement in your condition.      Other Instructions:  Please carefully review your entire AFTER VISIT SUMMARY document for all discharge instructions.

## 2021-10-09 LAB
BACTERIA UR CULT: NORMAL
SIGNIFICANT IND 70042: NORMAL
SITE SITE: NORMAL
SOURCE SOURCE: NORMAL

## 2021-10-18 ENCOUNTER — ROUTINE PRENATAL (OUTPATIENT)
Dept: OBGYN | Facility: CLINIC | Age: 21
End: 2021-10-18
Payer: COMMERCIAL

## 2021-10-18 VITALS — BODY MASS INDEX: 27.37 KG/M2 | DIASTOLIC BLOOD PRESSURE: 80 MMHG | WEIGHT: 180 LBS | SYSTOLIC BLOOD PRESSURE: 121 MMHG

## 2021-10-18 DIAGNOSIS — Z34.03 ENCOUNTER FOR SUPERVISION OF NORMAL FIRST PREGNANCY IN THIRD TRIMESTER: Primary | ICD-10-CM

## 2021-10-18 DIAGNOSIS — Z86.19 HISTORY OF HERPES LABIALIS: ICD-10-CM

## 2021-10-18 LAB
APPEARANCE UR: CLEAR
BILIRUB UR STRIP-MCNC: NORMAL MG/DL
COLOR UR AUTO: YELLOW
GLUCOSE UR STRIP.AUTO-MCNC: NORMAL MG/DL
KETONES UR STRIP.AUTO-MCNC: NORMAL MG/DL
LEUKOCYTE ESTERASE UR QL STRIP.AUTO: NORMAL
NITRITE UR QL STRIP.AUTO: NORMAL
PH UR STRIP.AUTO: 6 [PH] (ref 5–8)
PROT UR QL STRIP: NORMAL MG/DL
RBC UR QL AUTO: NORMAL
SP GR UR STRIP.AUTO: 10.1
UROBILINOGEN UR STRIP-MCNC: NORMAL MG/DL

## 2021-10-18 PROCEDURE — 81002 URINALYSIS NONAUTO W/O SCOPE: CPT | Performed by: OBSTETRICS & GYNECOLOGY

## 2021-10-18 PROCEDURE — 90040 PR PRENATAL FOLLOW UP: CPT | Performed by: OBSTETRICS & GYNECOLOGY

## 2021-10-18 RX ORDER — ACYCLOVIR 400 MG/1
400 TABLET ORAL 3 TIMES DAILY
Qty: 90 TABLET | Refills: 2 | Status: SHIPPED | OUTPATIENT
Start: 2021-10-18

## 2021-10-18 ASSESSMENT — FIBROSIS 4 INDEX: FIB4 SCORE: 0.26

## 2021-10-25 ENCOUNTER — HOSPITAL ENCOUNTER (OUTPATIENT)
Dept: LAB | Facility: MEDICAL CENTER | Age: 21
End: 2021-10-25
Attending: OBSTETRICS & GYNECOLOGY
Payer: COMMERCIAL

## 2021-10-25 DIAGNOSIS — Z34.03 ENCOUNTER FOR SUPERVISION OF NORMAL FIRST PREGNANCY IN THIRD TRIMESTER: ICD-10-CM

## 2021-10-25 LAB
BASOPHILS # BLD AUTO: 0.2 % (ref 0–1.8)
BASOPHILS # BLD: 0.02 K/UL (ref 0–0.12)
CREAT UR-MCNC: 255.54 MG/DL
EOSINOPHIL # BLD AUTO: 0.06 K/UL (ref 0–0.51)
EOSINOPHIL NFR BLD: 0.7 % (ref 0–6.9)
ERYTHROCYTE [DISTWIDTH] IN BLOOD BY AUTOMATED COUNT: 40.5 FL (ref 35.9–50)
HCT VFR BLD AUTO: 34.1 % (ref 37–47)
HGB BLD-MCNC: 11.2 G/DL (ref 12–16)
IMM GRANULOCYTES # BLD AUTO: 0.08 K/UL (ref 0–0.11)
IMM GRANULOCYTES NFR BLD AUTO: 0.9 % (ref 0–0.9)
LYMPHOCYTES # BLD AUTO: 2.39 K/UL (ref 1–4.8)
LYMPHOCYTES NFR BLD: 26 % (ref 22–41)
MCH RBC QN AUTO: 28.9 PG (ref 27–33)
MCHC RBC AUTO-ENTMCNC: 32.8 G/DL (ref 33.6–35)
MCV RBC AUTO: 87.9 FL (ref 81.4–97.8)
MONOCYTES # BLD AUTO: 0.66 K/UL (ref 0–0.85)
MONOCYTES NFR BLD AUTO: 7.2 % (ref 0–13.4)
NEUTROPHILS # BLD AUTO: 5.99 K/UL (ref 2–7.15)
NEUTROPHILS NFR BLD: 65 % (ref 44–72)
NRBC # BLD AUTO: 0 K/UL
NRBC BLD-RTO: 0 /100 WBC
PLATELET # BLD AUTO: 273 K/UL (ref 164–446)
PMV BLD AUTO: 10.8 FL (ref 9–12.9)
PROT UR-MCNC: 23 MG/DL (ref 0–15)
PROT/CREAT UR: 90 MG/G (ref 10–107)
RBC # BLD AUTO: 3.88 M/UL (ref 4.2–5.4)
WBC # BLD AUTO: 9.2 K/UL (ref 4.8–10.8)

## 2021-10-25 PROCEDURE — 84156 ASSAY OF PROTEIN URINE: CPT

## 2021-10-25 PROCEDURE — 36415 COLL VENOUS BLD VENIPUNCTURE: CPT

## 2021-10-25 PROCEDURE — 85025 COMPLETE CBC W/AUTO DIFF WBC: CPT

## 2021-10-25 PROCEDURE — 82570 ASSAY OF URINE CREATININE: CPT

## 2021-10-28 ENCOUNTER — TELEPHONE (OUTPATIENT)
Dept: OBGYN | Facility: CLINIC | Age: 21
End: 2021-10-28

## 2021-10-28 NOTE — TELEPHONE ENCOUNTER
Pt advised 10/28 normal pi labs.tmm----- Message from Jeanne Suggs M.D. sent at 10/25/2021 11:23 AM PDT -----  Normal PIH labs. aw

## 2021-11-03 ENCOUNTER — ROUTINE PRENATAL (OUTPATIENT)
Dept: OBGYN | Facility: CLINIC | Age: 21
End: 2021-11-03
Payer: COMMERCIAL

## 2021-11-03 VITALS — BODY MASS INDEX: 28.28 KG/M2 | WEIGHT: 186 LBS | SYSTOLIC BLOOD PRESSURE: 130 MMHG | DIASTOLIC BLOOD PRESSURE: 80 MMHG

## 2021-11-03 DIAGNOSIS — Z34.03 ENCOUNTER FOR SUPERVISION OF NORMAL FIRST PREGNANCY IN THIRD TRIMESTER: ICD-10-CM

## 2021-11-03 PROCEDURE — 90040 PR PRENATAL FOLLOW UP: CPT | Performed by: OBSTETRICS & GYNECOLOGY

## 2021-11-03 ASSESSMENT — FIBROSIS 4 INDEX: FIB4 SCORE: 0.25

## 2021-11-03 NOTE — PROGRESS NOTES
Doing well. No complaints. Reports active FM and is doing KCs. Denies CTXs, vb, or LOF. Denies S&S of PIH. Will  RX and start in 1-2 weeks for HSV suppression. She denies any lesions or symptoms. PTL prec dsicussed. Continue KCs. ?S answered.     20 y/o female  with EDC 2021 by LMP and 8 week US    FOB: [place]    PNLs: Rh+; immune, neg, neg, neg  Aneuploidy screen: low risk sequential screen  Glucola: normal (123)    GENITAL HSV- NEEDS SUPPRESSION    Family h/o cleft lip-   Normal first tri US with NT and nasal bone/ low risk seq screens/ normal anatomy US    [X] - TdAP given 21  [X] Covid vaccine received  and  and Booster received in 10/21  [X] - Flu vaccine received @ work    GBS POSITIVE IN URINE- TREAT IN LABOR    Deliver @ [facility]/Peds- Dr Severino ([facility])

## 2021-11-12 NOTE — TELEPHONE ENCOUNTER
Several calls made to Chyna from [employer] and others.  Left message on her Mom's cell to have her call us.  Not urgent.   Post-Care Instructions: Liquid Nitrogen is an extremely cold liquid. When applied to the skin, it causes rapid freezing, producing instantaneous frostbite. Immediately following treatment you will notice redness, swelling, and maybe mild itching or tingling. This sensation will subside in a few minutes. A superficial blister may form and scabbing will follow. If extensive freezing has been done, a blister may develop which may be large and filled with blood. If the blister is uncomfortable, it is okay to puncture it with a sterile (alcohol-soaked) needle, leaving the blister top in place.\\n\\nWash the area daily with soap and water and gently pat dry. Apply a thin layer of Vaseline or Aquaphor twice daily to the wound. You may choose to use a bandage or leave it open. For several days, there may be weeping or oozing of clear or blood-tinged fluid from the treatment area. A crust or scab usually forms within 1-2 weeks. The crust should fall off on its own in 2-4 weeks. There is occasionally a loss of pigment from the area, which generally resolves in 3-4 months.\\n\\nOccasionally some mild pain will occur in the treated areas, which can be controlled with Tylenol Extra Strength. If an area near the eyelid has been treated, swelling may occur. In general, cryotherapy causes minimal scar formation and only rare post-operative infections.\\n\\nWhen should I call the office?\\nIf you see redness developing/spreading, increasing pain, drainage of pus, spreading of the incision, or have a question, call the office at [phone]. If you are calling after the office has closed, please call [phone], listen to the message in its entirety and when prompted, press \"1\" to be connected to our answering service. Post-Care Instructions: Liquid Nitrogen is an extremely cold liquid. When applied to the skin, it causes rapid freezing, producing instantaneous frostbite. Immediately following treatment you will notice redness, swelling, and maybe mild itching or tingling. This sensation will subside in a few minutes. A superficial blister may form and scabbing will follow. If extensive freezing has been done, a blister may develop which may be large and filled with blood. If the blister is uncomfortable, it is okay to puncture it with a sterile (alcohol-soaked) needle, leaving the blister top in place.\\n\\nWash the area daily with soap and water and gently pat dry. Apply a thin layer of Vaseline or Aquaphor twice daily to the wound. You may choose to use a bandage or leave it open. For several days, there may be weeping or oozing of clear or blood-tinged fluid from the treatment area. A crust or scab usually forms within 1-2 weeks. The crust should fall off on its own in 2-4 weeks. There is occasionally a loss of pigment from the area, which generally resolves in 3-4 months.\\n\\nOccasionally some mild pain will occur in the treated areas, which can be controlled with Tylenol Extra Strength. If an area near the eyelid has been treated, swelling may occur. In general, cryotherapy causes minimal scar formation and only rare post-operative infections.\\n\\nWhen should I call the office?\\nIf you see redness developing/spreading, increasing pain, drainage of pus, spreading of the incision, or have a question, call the office at [phone]. If you are calling after the office has closed, please call [phone], listen to the message in its entirety and when prompted, press \"1\" to be connected to our answering service.

## 2021-11-17 ENCOUNTER — ROUTINE PRENATAL (OUTPATIENT)
Dept: OBGYN | Facility: CLINIC | Age: 21
End: 2021-11-17
Payer: COMMERCIAL

## 2021-11-17 VITALS — BODY MASS INDEX: 28.43 KG/M2 | SYSTOLIC BLOOD PRESSURE: 116 MMHG | DIASTOLIC BLOOD PRESSURE: 78 MMHG | WEIGHT: 187 LBS

## 2021-11-17 DIAGNOSIS — Z34.03 ENCOUNTER FOR SUPERVISION OF NORMAL FIRST PREGNANCY IN THIRD TRIMESTER: ICD-10-CM

## 2021-11-17 PROCEDURE — 90040 PR PRENATAL FOLLOW UP: CPT | Performed by: OBSTETRICS & GYNECOLOGY

## 2021-11-17 ASSESSMENT — FIBROSIS 4 INDEX: FIB4 SCORE: 0.25

## 2021-11-17 NOTE — PROGRESS NOTES
Doing well. No complaints. Reports active FM and doing KCs. Denies CTXs, vb, or LOF. Denies S&S of PIH. Has started acyclovir suppression and has no symptoms or lesions. Discussed +GBS in urine in pregnancy and no need for repeating GBS and will treat in labor. Confirmed no allergies.  Has registered and is established with peds. Labor prec. Continue Kcs. All ?S answered.     20 y/o female  with EDC 2021 by LMP and 8 week US    FOB: [place]    PNLs: Rh+; immune, neg, neg, neg  Aneuploidy screen: low risk sequential screen  Glucola: normal (123)    GENITAL HSV- NEEDS SUPPRESSION    Family h/o cleft lip-   Normal first tri US with NT and nasal bone/ low risk seq screens/ normal anatomy US    [X] - TdAP given 21  [X] Covid vaccine received  and  and Booster received in 10/21  [X] - Flu vaccine received @ work    GBS POSITIVE IN URINE- TREAT IN LABOR    Deliver @ [facility]/Peds- Dr Severino ([facility])

## 2021-11-24 ENCOUNTER — TELEPHONE (OUTPATIENT)
Dept: OBGYN | Facility: CLINIC | Age: 21
End: 2021-11-24

## 2021-11-24 NOTE — TELEPHONE ENCOUNTER
Patient c/o dizziness since the start of the day that is worse with movement. Patient stated she checked her Blood Pressure and it read 95/70 and her heart rate was 123 at rest. Advised

## 2021-11-28 ENCOUNTER — APPOINTMENT (OUTPATIENT)
Dept: RADIOLOGY | Facility: MEDICAL CENTER | Age: 21
End: 2021-11-28
Attending: OBSTETRICS & GYNECOLOGY
Payer: COMMERCIAL

## 2021-11-28 ENCOUNTER — HOSPITAL ENCOUNTER (EMERGENCY)
Facility: MEDICAL CENTER | Age: 21
End: 2021-11-28
Attending: OBSTETRICS & GYNECOLOGY | Admitting: OBSTETRICS & GYNECOLOGY
Payer: COMMERCIAL

## 2021-11-28 VITALS
OXYGEN SATURATION: 97 % | BODY MASS INDEX: 28.04 KG/M2 | RESPIRATION RATE: 16 BRPM | HEIGHT: 68 IN | WEIGHT: 185 LBS | TEMPERATURE: 98.2 F | DIASTOLIC BLOOD PRESSURE: 75 MMHG | SYSTOLIC BLOOD PRESSURE: 126 MMHG | HEART RATE: 90 BPM

## 2021-11-28 LAB — CRYSTALS AMN MICRO: NORMAL

## 2021-11-28 PROCEDURE — 89060 EXAM SYNOVIAL FLUID CRYSTALS: CPT

## 2021-11-28 PROCEDURE — 99284 EMERGENCY DEPT VISIT MOD MDM: CPT

## 2021-11-28 PROCEDURE — 59025 FETAL NON-STRESS TEST: CPT

## 2021-11-28 PROCEDURE — 76819 FETAL BIOPHYS PROFIL W/O NST: CPT

## 2021-11-28 ASSESSMENT — FIBROSIS 4 INDEX: FIB4 SCORE: 0.25

## 2021-11-28 NOTE — PROGRESS NOTES
"0710- rec'd report from nightshift rn. Pt here for dfm. Pt denies uc,lof or bleeding. Pt stated feeling \"moist\" to previous nurse so fern was sent. Pt reactive on monitor.    0745- spoke to dr duncan and reported neg fern and reactive nst. Orders for bpp and if 8/8 may d/c home. Pt updated on poc and awaiting u/s    0855- dr duncan on unit and aware awaiting for official u//s report but was verbally told landon 5.2. dr duncan will round on pp then call me with poc for pt. Pt updated    1005- dr duncan at bedside and orders to d/c home and have pt scheduled bpp on tuesday 1025- pt verbalized understanding of kick counts and was bought to the  prior to d/c to schedule nst/bpp Tuesday. Pt ambulated out of triage in nad and feeling movement of baby  "

## 2021-11-28 NOTE — DISCHARGE INSTRUCTIONS
General Instructions:  · If you think you are in labor, time contractions (lying on your left side) from the beginning of one contraction to the beginning of the next contraction for at least one hour.  · Increase fluid intake: you should consume 10-12 8 oz glasses of non-caffeinated fluid per day.  · Report any pressure or burning on urination to your physician.  · Monitor fetal movement: If you notice an absence or decrease in fetal movement, drink a large glass of water and rest on your side.  If there is no increase in movement, call your physician or go to the hospital for further evaluation.  · Report any sudden, sharp abdominal pain.  · Report any bleeding.  Spotting or pinkish discharge is normal after vaginal exam.  You may also spot after sexual intercourse.    Pre-term Labor (<37 weeks):  Call your physician or return to the hospital if:  · You have painless regular contractions more than 4 in one hour.  · Your water breaks (remember time and color).  · You have menstrual-like cramps, a low dull backache or pressure in your pelvis or back.  · Your baby does not move enough to complete the daily kick count (10 movements in 2 hours).  · Your baby moves much less often than on the days before or you have not felt your baby move all day.  · Please review the MEDICATION LIST section of your AFTER VISIT SUMMARY document.  Take your medication as prescribedPerform a kick count once a day at approximately the same time each day. Babies' activity levels are usually higher in the evening after dinner.    To perform a kick count, lie on your left side and focus on your baby's movements: rolls, kicks, or flutters. Record the number of minutes it takes to feel your baby move ten times. You may stop counting after your baby has moved 10 times.    · If your baby does not move at least 10 times in 1 hour or if there is a sudden decrease in movement, call your doctor or nurse midwife.      Other Instructions:  Please  carefully review your entire AFTER VISIT SUMMARY document for all discharge instructions.

## 2021-11-28 NOTE — PROGRESS NOTES
"0632: Pt reports to triage with complaint of decreased fetal movement. Pt states she is feeling movement, but it is \"less than normal.\" Pt is  with EDC  making her 36&6. Pt denies VB, pt reports some small amount of fluid and discharge noted on underwear. EFM/[place] applied.     SSE done, Fern collected. No pooling noted, large amounts of tan discharge present.     0700: Report to Goldie KYA. POC discussed.   "

## 2021-11-30 ENCOUNTER — HOSPITAL ENCOUNTER (OUTPATIENT)
Facility: MEDICAL CENTER | Age: 21
End: 2021-11-30
Attending: OBSTETRICS & GYNECOLOGY | Admitting: OBSTETRICS & GYNECOLOGY
Payer: COMMERCIAL

## 2021-11-30 ENCOUNTER — APPOINTMENT (OUTPATIENT)
Dept: RADIOLOGY | Facility: MEDICAL CENTER | Age: 21
End: 2021-11-30
Attending: OBSTETRICS & GYNECOLOGY
Payer: COMMERCIAL

## 2021-11-30 VITALS
BODY MASS INDEX: 28.04 KG/M2 | OXYGEN SATURATION: 97 % | HEART RATE: 93 BPM | RESPIRATION RATE: 18 BRPM | SYSTOLIC BLOOD PRESSURE: 121 MMHG | DIASTOLIC BLOOD PRESSURE: 74 MMHG | HEIGHT: 68 IN | TEMPERATURE: 98.4 F | WEIGHT: 185 LBS

## 2021-11-30 PROCEDURE — 76819 FETAL BIOPHYS PROFIL W/O NST: CPT

## 2021-11-30 PROCEDURE — 59025 FETAL NON-STRESS TEST: CPT

## 2021-11-30 ASSESSMENT — PAIN SCALES - GENERAL: PAINLEVEL: 0 - NO PAIN

## 2021-11-30 ASSESSMENT — FIBROSIS 4 INDEX: FIB4 SCORE: 0.25

## 2021-11-30 NOTE — PROGRESS NOTES
21 y.o.  EDC 21 EGA 37.1 here to LDa2 with FOB [place], expecting a baby boy Eloy, for scheduled NST and BPP. Pt was evaluated for LOF, amnisure negative, US showed TORRI 5.3, here for routine f/u. VSS, afebrile. , moderate variability, accels present, no decels. BPP ordered. BPP 8/* Torri 7.3. Report to Dr. Swift on call for [place]. Discharge order received. PT to f/u in office for next scheduled appointment. Discharged to home, ambulatory.

## 2021-12-01 ENCOUNTER — TELEPHONE (OUTPATIENT)
Dept: OBGYN | Facility: CLINIC | Age: 21
End: 2021-12-01

## 2021-12-01 ENCOUNTER — ROUTINE PRENATAL (OUTPATIENT)
Dept: OBGYN | Facility: CLINIC | Age: 21
End: 2021-12-01
Payer: COMMERCIAL

## 2021-12-01 ENCOUNTER — HOSPITAL ENCOUNTER (EMERGENCY)
Facility: MEDICAL CENTER | Age: 21
End: 2021-12-01
Attending: OBSTETRICS & GYNECOLOGY | Admitting: OBSTETRICS & GYNECOLOGY
Payer: COMMERCIAL

## 2021-12-01 VITALS
WEIGHT: 190 LBS | HEART RATE: 95 BPM | HEIGHT: 68 IN | RESPIRATION RATE: 16 BRPM | DIASTOLIC BLOOD PRESSURE: 84 MMHG | BODY MASS INDEX: 28.79 KG/M2 | SYSTOLIC BLOOD PRESSURE: 129 MMHG | TEMPERATURE: 97.8 F

## 2021-12-01 VITALS — DIASTOLIC BLOOD PRESSURE: 80 MMHG | SYSTOLIC BLOOD PRESSURE: 121 MMHG | BODY MASS INDEX: 28.83 KG/M2 | WEIGHT: 189.6 LBS

## 2021-12-01 DIAGNOSIS — Z34.03 ENCOUNTER FOR SUPERVISION OF NORMAL FIRST PREGNANCY IN THIRD TRIMESTER: ICD-10-CM

## 2021-12-01 LAB
A1 MICROGLOB PLACENTAL VAG QL: NEGATIVE
APPEARANCE UR: CLEAR
BILIRUB UR STRIP-MCNC: NORMAL MG/DL
COLOR UR AUTO: YELLOW
CRYSTALS AMN MICRO: NORMAL
GLUCOSE UR STRIP.AUTO-MCNC: NORMAL MG/DL
KETONES UR STRIP.AUTO-MCNC: NORMAL MG/DL
LEUKOCYTE ESTERASE UR QL STRIP.AUTO: NORMAL
NITRITE UR QL STRIP.AUTO: NORMAL
PH UR STRIP.AUTO: 6.5 [PH] (ref 5–8)
PROT UR QL STRIP: NORMAL MG/DL
RBC UR QL AUTO: NORMAL
SP GR UR STRIP.AUTO: 1.01
UROBILINOGEN UR STRIP-MCNC: NORMAL MG/DL

## 2021-12-01 PROCEDURE — 90040 PR PRENATAL FOLLOW UP: CPT | Performed by: OBSTETRICS & GYNECOLOGY

## 2021-12-01 PROCEDURE — 81002 URINALYSIS NONAUTO W/O SCOPE: CPT | Performed by: OBSTETRICS & GYNECOLOGY

## 2021-12-01 PROCEDURE — 89060 EXAM SYNOVIAL FLUID CRYSTALS: CPT

## 2021-12-01 PROCEDURE — 84112 EVAL AMNIOTIC FLUID PROTEIN: CPT

## 2021-12-01 PROCEDURE — 59025 FETAL NON-STRESS TEST: CPT

## 2021-12-01 PROCEDURE — 302449 STATCHG TRIAGE ONLY (STATISTIC)

## 2021-12-01 ASSESSMENT — FIBROSIS 4 INDEX
FIB4 SCORE: 0.25
FIB4 SCORE: 0.25

## 2021-12-01 ASSESSMENT — PAIN SCALES - GENERAL: PAINLEVEL: 4

## 2021-12-01 NOTE — PROGRESS NOTES
21 y.o.  EDC  (37.2 wks)     Pt presents to L&D c/o SROM at 1300, cramping, and loss of mucous plug. Denies VB and reports +FM. Small amt of fluid pooling noted upon sterile speculum exam, fern collected and sent to lab. SVE /-1.     Dr Suggs notified of negative Fern. Amnisure collected.     Dr Suggs notified of negative Amnisure. D/c order received. D/c instructions reviewed with pt.

## 2021-12-01 NOTE — TELEPHONE ENCOUNTER
PT called stating she was checked for dilation today 12/1/21. She is now having bloody, watery discharge with cramping. I consulted with Dr. Suggs and she advised PT should to go labor and delivery. PT verbalized understanding.

## 2021-12-01 NOTE — PROGRESS NOTES
Pt without complaints. Was seen on L&D  for rule out rupture. The amnisure was negative. Her BPP was 10/10. Her TORRI was 5.3. She was seen again yesterday for scheduled monitoring. Her NST was reactive. Her BPP was 10/10. TORRI was 7.5. She reports active FM. She reports irregualr CTXs, denies vb or any further discharge or LOF. She denies S&S of PIH. She is taking acyclovir and deneis any lesions or symptoms. Thorough external genital exam shows no lesions. Her cx 3/70/-2 BBOW. Labor prec. Will scheduled for NST/FI on Saturday @ [facility]. All ?s answered. Continue KCs.     20 y/o female  with EDC 2021 by LMP and 8 week US    FOB: [place]    PNLs: Rh+; immune, neg, neg, neg  Aneuploidy screen: low risk sequential screen  Glucola: normal (123)    GENITAL HSV- NEEDS SUPPRESSION    Family h/o cleft lip-   Normal first tri US with NT and nasal bone/ low risk seq screens/ normal anatomy US    [X] - TdAP given 21  [X] Covid vaccine received  and  and Booster received in 10/21  [X] - Flu vaccine received @ work    GBS POSITIVE IN URINE- TREAT IN LABOR    Deliver @ [facility]/Peds- Dr Severino ([facility])

## 2021-12-01 NOTE — TELEPHONE ENCOUNTER
Nu from [facility] called requesting printed Rx for breast pump and fax to [phone]. Message given to Tessa

## 2021-12-02 NOTE — ED PROVIDER NOTES
22 yo  @ 36w6d seen in triage for decreased FM. By the time she arrived she was feeling movement and NST  was reactive and reassuring. BPP was completed, 10/10, TORRI 5.3. Pt was set up to have a repeat BPP in 3 days, encouraged oral hydration. Strict PTL and FKC reviewed. Pt to return to triage in 3 days for BPP.

## 2021-12-02 NOTE — ED PROVIDER NOTES
This version of the note has been redacted during the course of a chart correction case. If you need access to the original text of this version of the note, please contact the Health Information Management department at [phone].

## 2021-12-02 NOTE — ED PROVIDER NOTES
This version of the note has been redacted during the course of a chart correction case. If you need access to the original text of this version of the note, please contact the Health Information Management department at [phone].

## 2021-12-04 ENCOUNTER — APPOINTMENT (OUTPATIENT)
Dept: OBGYN | Facility: MEDICAL CENTER | Age: 21
End: 2021-12-04
Attending: OBSTETRICS & GYNECOLOGY
Payer: COMMERCIAL

## 2021-12-04 ENCOUNTER — HOSPITAL ENCOUNTER (OUTPATIENT)
Facility: MEDICAL CENTER | Age: 21
End: 2021-12-04
Attending: OBSTETRICS & GYNECOLOGY | Admitting: OBSTETRICS & GYNECOLOGY
Payer: COMMERCIAL

## 2021-12-04 VITALS
RESPIRATION RATE: 20 BRPM | SYSTOLIC BLOOD PRESSURE: 125 MMHG | HEIGHT: 68 IN | TEMPERATURE: 98.3 F | OXYGEN SATURATION: 98 % | DIASTOLIC BLOOD PRESSURE: 81 MMHG | HEART RATE: 116 BPM | BODY MASS INDEX: 28.79 KG/M2 | WEIGHT: 190 LBS

## 2021-12-04 PROCEDURE — 59025 FETAL NON-STRESS TEST: CPT

## 2021-12-04 ASSESSMENT — FIBROSIS 4 INDEX: FIB4 SCORE: 0.25

## 2021-12-04 ASSESSMENT — PAIN SCALES - GENERAL: PAINLEVEL: 0 - NO PAIN

## 2021-12-04 NOTE — PROGRESS NOTES
Patient comes in for scheduled NST for low TORRI.  Monitors applies, tracing reactive. Patient states that she feels occasional contractions.  Denies leaking or bleeding.  Feels fetal movement.  Dr Suggs called. Tracing reviewed.  Patient to be discharged. Discharge instructions reviewed including fetal movement and kick counts and labor precautions.      Patient ambulated out.

## 2021-12-06 ENCOUNTER — TELEPHONE (OUTPATIENT)
Dept: OBGYN | Facility: CLINIC | Age: 21
End: 2021-12-06

## 2021-12-07 ENCOUNTER — HOSPITAL ENCOUNTER (INPATIENT)
Facility: MEDICAL CENTER | Age: 21
LOS: 3 days | End: 2021-12-10
Attending: OBSTETRICS & GYNECOLOGY | Admitting: OBSTETRICS & GYNECOLOGY
Payer: COMMERCIAL

## 2021-12-07 ENCOUNTER — NON-PROVIDER VISIT (OUTPATIENT)
Dept: OBGYN | Facility: CLINIC | Age: 21
End: 2021-12-07
Payer: COMMERCIAL

## 2021-12-07 ENCOUNTER — ANESTHESIA EVENT (OUTPATIENT)
Dept: ANESTHESIOLOGY | Facility: MEDICAL CENTER | Age: 21
End: 2021-12-07
Payer: COMMERCIAL

## 2021-12-07 ENCOUNTER — ANESTHESIA (OUTPATIENT)
Dept: ANESTHESIOLOGY | Facility: MEDICAL CENTER | Age: 21
End: 2021-12-07
Payer: COMMERCIAL

## 2021-12-07 ENCOUNTER — TELEPHONE (OUTPATIENT)
Dept: OBGYN | Facility: CLINIC | Age: 21
End: 2021-12-07

## 2021-12-07 VITALS — DIASTOLIC BLOOD PRESSURE: 75 MMHG | SYSTOLIC BLOOD PRESSURE: 122 MMHG

## 2021-12-07 LAB
BASOPHILS # BLD AUTO: 0.3 % (ref 0–1.8)
BASOPHILS # BLD: 0.03 K/UL (ref 0–0.12)
EOSINOPHIL # BLD AUTO: 0.01 K/UL (ref 0–0.51)
EOSINOPHIL NFR BLD: 0.1 % (ref 0–6.9)
ERYTHROCYTE [DISTWIDTH] IN BLOOD BY AUTOMATED COUNT: 38.5 FL (ref 35.9–50)
HCT VFR BLD AUTO: 34.2 % (ref 37–47)
HGB BLD-MCNC: 11.5 G/DL (ref 12–16)
HOLDING TUBE BB 8507: NORMAL
IMM GRANULOCYTES # BLD AUTO: 0.06 K/UL (ref 0–0.11)
IMM GRANULOCYTES NFR BLD AUTO: 0.5 % (ref 0–0.9)
LYMPHOCYTES # BLD AUTO: 1.99 K/UL (ref 1–4.8)
LYMPHOCYTES NFR BLD: 17.7 % (ref 22–41)
MCH RBC QN AUTO: 27.6 PG (ref 27–33)
MCHC RBC AUTO-ENTMCNC: 33.6 G/DL (ref 33.6–35)
MCV RBC AUTO: 82 FL (ref 81.4–97.8)
MONOCYTES # BLD AUTO: 0.77 K/UL (ref 0–0.85)
MONOCYTES NFR BLD AUTO: 6.9 % (ref 0–13.4)
NEUTROPHILS # BLD AUTO: 8.36 K/UL (ref 2–7.15)
NEUTROPHILS NFR BLD: 74.5 % (ref 44–72)
NRBC # BLD AUTO: 0 K/UL
NRBC BLD-RTO: 0 /100 WBC
PLATELET # BLD AUTO: 279 K/UL (ref 164–446)
PMV BLD AUTO: 10.8 FL (ref 9–12.9)
RBC # BLD AUTO: 4.17 M/UL (ref 4.2–5.4)
WBC # BLD AUTO: 11.2 K/UL (ref 4.8–10.8)

## 2021-12-07 PROCEDURE — 85025 COMPLETE CBC W/AUTO DIFF WBC: CPT

## 2021-12-07 PROCEDURE — 303615 HCHG EPIDURAL/SPINAL ANESTHESIA FOR LABOR

## 2021-12-07 PROCEDURE — 700111 HCHG RX REV CODE 636 W/ 250 OVERRIDE (IP)

## 2021-12-07 PROCEDURE — 36415 COLL VENOUS BLD VENIPUNCTURE: CPT

## 2021-12-07 PROCEDURE — 700111 HCHG RX REV CODE 636 W/ 250 OVERRIDE (IP): Performed by: OBSTETRICS & GYNECOLOGY

## 2021-12-07 PROCEDURE — 700111 HCHG RX REV CODE 636 W/ 250 OVERRIDE (IP): Performed by: STUDENT IN AN ORGANIZED HEALTH CARE EDUCATION/TRAINING PROGRAM

## 2021-12-07 PROCEDURE — 700101 HCHG RX REV CODE 250: Performed by: STUDENT IN AN ORGANIZED HEALTH CARE EDUCATION/TRAINING PROGRAM

## 2021-12-07 PROCEDURE — 87426 SARSCOV CORONAVIRUS AG IA: CPT

## 2021-12-07 PROCEDURE — 700105 HCHG RX REV CODE 258: Performed by: OBSTETRICS & GYNECOLOGY

## 2021-12-07 PROCEDURE — 302449 STATCHG TRIAGE ONLY (STATISTIC)

## 2021-12-07 PROCEDURE — 770002 HCHG ROOM/CARE - OB PRIVATE (112)

## 2021-12-07 PROCEDURE — 3E0S3BZ INTRODUCTION OF ANESTHETIC AGENT INTO EPIDURAL SPACE, PERCUTANEOUS APPROACH: ICD-10-PCS | Performed by: STUDENT IN AN ORGANIZED HEALTH CARE EDUCATION/TRAINING PROGRAM

## 2021-12-07 RX ORDER — DEXTROSE, SODIUM CHLORIDE, SODIUM LACTATE, POTASSIUM CHLORIDE, AND CALCIUM CHLORIDE 5; .6; .31; .03; .02 G/100ML; G/100ML; G/100ML; G/100ML; G/100ML
INJECTION, SOLUTION INTRAVENOUS CONTINUOUS
Status: DISCONTINUED | OUTPATIENT
Start: 2021-12-08 | End: 2021-12-10 | Stop reason: HOSPADM

## 2021-12-07 RX ORDER — LIDOCAINE HYDROCHLORIDE AND EPINEPHRINE 15; 5 MG/ML; UG/ML
INJECTION, SOLUTION EPIDURAL
Status: COMPLETED | OUTPATIENT
Start: 2021-12-07 | End: 2021-12-07

## 2021-12-07 RX ORDER — SODIUM CHLORIDE 9 MG/ML
INJECTION, SOLUTION INTRAVENOUS
Status: ACTIVE
Start: 2021-12-07 | End: 2021-12-08

## 2021-12-07 RX ORDER — PENICILLIN G POTASSIUM 5000000 [IU]/1
5 INJECTION, POWDER, FOR SOLUTION INTRAMUSCULAR; INTRAVENOUS ONCE
Status: COMPLETED | OUTPATIENT
Start: 2021-12-07 | End: 2021-12-07

## 2021-12-07 RX ORDER — SODIUM CHLORIDE, SODIUM LACTATE, POTASSIUM CHLORIDE, CALCIUM CHLORIDE 600; 310; 30; 20 MG/100ML; MG/100ML; MG/100ML; MG/100ML
INJECTION, SOLUTION INTRAVENOUS CONTINUOUS
Status: ACTIVE | OUTPATIENT
Start: 2021-12-07 | End: 2021-12-08

## 2021-12-07 RX ORDER — ROPIVACAINE HYDROCHLORIDE 2 MG/ML
INJECTION, SOLUTION EPIDURAL; INFILTRATION
Status: COMPLETED | OUTPATIENT
Start: 2021-12-07 | End: 2021-12-07

## 2021-12-07 RX ORDER — ROPIVACAINE HYDROCHLORIDE 2 MG/ML
INJECTION, SOLUTION EPIDURAL; INFILTRATION; PERINEURAL CONTINUOUS
Status: DISCONTINUED | OUTPATIENT
Start: 2021-12-07 | End: 2021-12-10 | Stop reason: HOSPADM

## 2021-12-07 RX ORDER — ROPIVACAINE HYDROCHLORIDE 2 MG/ML
INJECTION, SOLUTION EPIDURAL; INFILTRATION; PERINEURAL
Status: COMPLETED
Start: 2021-12-07 | End: 2021-12-07

## 2021-12-07 RX ORDER — SODIUM CHLORIDE, SODIUM LACTATE, POTASSIUM CHLORIDE, AND CALCIUM CHLORIDE .6; .31; .03; .02 G/100ML; G/100ML; G/100ML; G/100ML
1000 INJECTION, SOLUTION INTRAVENOUS
Status: DISCONTINUED | OUTPATIENT
Start: 2021-12-07 | End: 2021-12-08 | Stop reason: HOSPADM

## 2021-12-07 RX ORDER — ONDANSETRON 2 MG/ML
4 INJECTION INTRAMUSCULAR; INTRAVENOUS EVERY 4 HOURS PRN
Status: DISCONTINUED | OUTPATIENT
Start: 2021-12-07 | End: 2021-12-07

## 2021-12-07 RX ORDER — SODIUM CHLORIDE, SODIUM LACTATE, POTASSIUM CHLORIDE, AND CALCIUM CHLORIDE .6; .31; .03; .02 G/100ML; G/100ML; G/100ML; G/100ML
250 INJECTION, SOLUTION INTRAVENOUS PRN
Status: DISCONTINUED | OUTPATIENT
Start: 2021-12-07 | End: 2021-12-08 | Stop reason: HOSPADM

## 2021-12-07 RX ORDER — PENICILLIN G POTASSIUM 5000000 [IU]/1
INJECTION, POWDER, FOR SOLUTION INTRAMUSCULAR; INTRAVENOUS
Status: COMPLETED
Start: 2021-12-07 | End: 2021-12-07

## 2021-12-07 RX ORDER — ONDANSETRON 2 MG/ML
4 INJECTION INTRAMUSCULAR; INTRAVENOUS EVERY 4 HOURS PRN
Status: DISCONTINUED | OUTPATIENT
Start: 2021-12-07 | End: 2021-12-08

## 2021-12-07 RX ADMIN — ONDANSETRON 4 MG: 2 INJECTION INTRAMUSCULAR; INTRAVENOUS at 22:02

## 2021-12-07 RX ADMIN — PENICILLIN G POTASSIUM 5 MILLION UNITS: 5000000 INJECTION, POWDER, FOR SOLUTION INTRAMUSCULAR; INTRAVENOUS at 22:00

## 2021-12-07 RX ADMIN — ROPIVACAINE HYDROCHLORIDE 10 ML: 2 INJECTION, SOLUTION EPIDURAL; INFILTRATION at 21:40

## 2021-12-07 RX ADMIN — SODIUM CHLORIDE, POTASSIUM CHLORIDE, SODIUM LACTATE AND CALCIUM CHLORIDE: 600; 310; 30; 20 INJECTION, SOLUTION INTRAVENOUS at 21:30

## 2021-12-07 RX ADMIN — PENICILLIN G POTASSIUM 5 MILLION UNITS: 5000000 POWDER, FOR SOLUTION INTRAMUSCULAR; INTRAPLEURAL; INTRATHECAL; INTRAVENOUS at 22:00

## 2021-12-07 RX ADMIN — ROPIVACAINE HYDROCHLORIDE 200 MG: 2 INJECTION, SOLUTION EPIDURAL; INFILTRATION at 22:15

## 2021-12-07 RX ADMIN — LIDOCAINE HYDROCHLORIDE,EPINEPHRINE BITARTRATE 3 ML: 15; .005 INJECTION, SOLUTION EPIDURAL; INFILTRATION; INTRACAUDAL; PERINEURAL at 21:40

## 2021-12-07 RX ADMIN — ROPIVACAINE HYDROCHLORIDE 200 MG: 2 INJECTION, SOLUTION EPIDURAL; INFILTRATION; PERINEURAL at 22:15

## 2021-12-07 ASSESSMENT — PAIN DESCRIPTION - PAIN TYPE
TYPE: ACUTE PAIN

## 2021-12-07 ASSESSMENT — LIFESTYLE VARIABLES
TOTAL SCORE: 0
ALCOHOL_USE: NO
EVER HAD A DRINK FIRST THING IN THE MORNING TO STEADY YOUR NERVES TO GET RID OF A HANGOVER: NO
ON A TYPICAL DAY WHEN YOU DRINK ALCOHOL HOW MANY DRINKS DO YOU HAVE: 0
TOTAL SCORE: 0
CONSUMPTION TOTAL: NEGATIVE
HAVE PEOPLE ANNOYED YOU BY CRITICIZING YOUR DRINKING: NO
AVERAGE NUMBER OF DAYS PER WEEK YOU HAVE A DRINK CONTAINING ALCOHOL: 0
HOW MANY TIMES IN THE PAST YEAR HAVE YOU HAD 5 OR MORE DRINKS IN A DAY: 0
EVER_SMOKED: NEVER
TOTAL SCORE: 0
EVER FELT BAD OR GUILTY ABOUT YOUR DRINKING: NO
DOES PATIENT WANT TO STOP DRINKING: NO
HAVE YOU EVER FELT YOU SHOULD CUT DOWN ON YOUR DRINKING: NO

## 2021-12-07 ASSESSMENT — FIBROSIS 4 INDEX: FIB4 SCORE: 0.25

## 2021-12-07 ASSESSMENT — PATIENT HEALTH QUESTIONNAIRE - PHQ9
2. FEELING DOWN, DEPRESSED, IRRITABLE, OR HOPELESS: NOT AT ALL
1. LITTLE INTEREST OR PLEASURE IN DOING THINGS: NOT AT ALL
SUM OF ALL RESPONSES TO PHQ9 QUESTIONS 1 AND 2: 0

## 2021-12-07 NOTE — TELEPHONE ENCOUNTER
Patient called seeking a blood pressure check appt. Patient was transferred to [facility] to make a MA appt for this check. Patient stated her personal bp machine has been giving her high readings.

## 2021-12-07 NOTE — NON-PROVIDER
Pt is here for a BP check. She states her machine was giving her high readings. Her BP was normal. I consulted with Dr. Irizarry and pt is good to go. She was advise the bring her bp machine if this happens again to compare the numbers.

## 2021-12-08 ENCOUNTER — TELEPHONE (OUTPATIENT)
Dept: OBGYN | Facility: CLINIC | Age: 21
End: 2021-12-08

## 2021-12-08 LAB
ERYTHROCYTE [DISTWIDTH] IN BLOOD BY AUTOMATED COUNT: 38.5 FL (ref 35.9–50)
HCT VFR BLD AUTO: 32.9 % (ref 37–47)
HGB BLD-MCNC: 10.9 G/DL (ref 12–16)
MCH RBC QN AUTO: 27.4 PG (ref 27–33)
MCHC RBC AUTO-ENTMCNC: 33.1 G/DL (ref 33.6–35)
MCV RBC AUTO: 82.7 FL (ref 81.4–97.8)
PLATELET # BLD AUTO: 253 K/UL (ref 164–446)
PMV BLD AUTO: 10.8 FL (ref 9–12.9)
RBC # BLD AUTO: 3.98 M/UL (ref 4.2–5.4)
SARS-COV+SARS-COV-2 AG RESP QL IA.RAPID: NOTDETECTED
SPECIMEN SOURCE: NORMAL
WBC # BLD AUTO: 15.6 K/UL (ref 4.8–10.8)

## 2021-12-08 PROCEDURE — 770002 HCHG ROOM/CARE - OB PRIVATE (112)

## 2021-12-08 PROCEDURE — 85027 COMPLETE CBC AUTOMATED: CPT

## 2021-12-08 PROCEDURE — 304965 HCHG RECOVERY SERVICES

## 2021-12-08 PROCEDURE — 99999 PR NO CHARGE: CPT | Performed by: OBSTETRICS & GYNECOLOGY

## 2021-12-08 PROCEDURE — 700102 HCHG RX REV CODE 250 W/ 637 OVERRIDE(OP): Performed by: OBSTETRICS & GYNECOLOGY

## 2021-12-08 PROCEDURE — 36415 COLL VENOUS BLD VENIPUNCTURE: CPT

## 2021-12-08 PROCEDURE — 0KQM0ZZ REPAIR PERINEUM MUSCLE, OPEN APPROACH: ICD-10-PCS | Performed by: OBSTETRICS & GYNECOLOGY

## 2021-12-08 PROCEDURE — A9270 NON-COVERED ITEM OR SERVICE: HCPCS | Performed by: OBSTETRICS & GYNECOLOGY

## 2021-12-08 PROCEDURE — 700111 HCHG RX REV CODE 636 W/ 250 OVERRIDE (IP): Performed by: OBSTETRICS & GYNECOLOGY

## 2021-12-08 PROCEDURE — 59409 OBSTETRICAL CARE: CPT

## 2021-12-08 PROCEDURE — 700111 HCHG RX REV CODE 636 W/ 250 OVERRIDE (IP)

## 2021-12-08 RX ORDER — ONDANSETRON 2 MG/ML
4 INJECTION INTRAMUSCULAR; INTRAVENOUS EVERY 6 HOURS PRN
Status: DISCONTINUED | OUTPATIENT
Start: 2021-12-08 | End: 2021-12-10 | Stop reason: HOSPADM

## 2021-12-08 RX ORDER — METHYLERGONOVINE MALEATE 0.2 MG/ML
0.2 INJECTION INTRAVENOUS
Status: DISCONTINUED | OUTPATIENT
Start: 2021-12-08 | End: 2021-12-10 | Stop reason: HOSPADM

## 2021-12-08 RX ORDER — DOCUSATE SODIUM 100 MG/1
100 CAPSULE, LIQUID FILLED ORAL 2 TIMES DAILY PRN
Status: DISCONTINUED | OUTPATIENT
Start: 2021-12-08 | End: 2021-12-10 | Stop reason: HOSPADM

## 2021-12-08 RX ORDER — ONDANSETRON 4 MG/1
4 TABLET, ORALLY DISINTEGRATING ORAL EVERY 6 HOURS PRN
Status: DISCONTINUED | OUTPATIENT
Start: 2021-12-08 | End: 2021-12-10 | Stop reason: HOSPADM

## 2021-12-08 RX ORDER — ACETAMINOPHEN 325 MG/1
325 TABLET ORAL EVERY 4 HOURS PRN
Status: DISCONTINUED | OUTPATIENT
Start: 2021-12-08 | End: 2021-12-10 | Stop reason: HOSPADM

## 2021-12-08 RX ORDER — IBUPROFEN 600 MG/1
600 TABLET ORAL EVERY 6 HOURS PRN
Status: DISCONTINUED | OUTPATIENT
Start: 2021-12-08 | End: 2021-12-10 | Stop reason: HOSPADM

## 2021-12-08 RX ORDER — SODIUM CHLORIDE, SODIUM LACTATE, POTASSIUM CHLORIDE, CALCIUM CHLORIDE 600; 310; 30; 20 MG/100ML; MG/100ML; MG/100ML; MG/100ML
INJECTION, SOLUTION INTRAVENOUS PRN
Status: DISCONTINUED | OUTPATIENT
Start: 2021-12-08 | End: 2021-12-10 | Stop reason: HOSPADM

## 2021-12-08 RX ORDER — MISOPROSTOL 200 UG/1
600 TABLET ORAL
Status: DISCONTINUED | OUTPATIENT
Start: 2021-12-08 | End: 2021-12-10 | Stop reason: HOSPADM

## 2021-12-08 RX ADMIN — OXYTOCIN 2000 ML/HR: 10 INJECTION, SOLUTION INTRAMUSCULAR; INTRAVENOUS at 02:02

## 2021-12-08 RX ADMIN — OXYTOCIN 125 ML/HR: 10 INJECTION, SOLUTION INTRAMUSCULAR; INTRAVENOUS at 02:40

## 2021-12-08 RX ADMIN — IBUPROFEN 600 MG: 600 TABLET, FILM COATED ORAL at 20:36

## 2021-12-08 RX ADMIN — IBUPROFEN 600 MG: 600 TABLET, FILM COATED ORAL at 09:19

## 2021-12-08 RX ADMIN — IBUPROFEN 600 MG: 600 TABLET, FILM COATED ORAL at 02:53

## 2021-12-08 ASSESSMENT — PAIN SCALES - GENERAL: PAIN_LEVEL: 0

## 2021-12-08 ASSESSMENT — PAIN DESCRIPTION - PAIN TYPE
TYPE: ACUTE PAIN

## 2021-12-08 NOTE — H&P
HPI: This is a 21 y.o. yo  at 38w2d by LMP/sono who presents with ctx.  Denies VB/LOF.  No HA/visual changes RUQ pain.  Normal prenatal course.  Pt has a h/o of HSV but has been on suppressive treatment and denies any current symptoms. Pt was known to be 4 cm but came into triage with complaints of ctx q 5 mins and in a lot of pain. She was 6/80/-1 per triage nurse with bulging bag.     Prenatal labs:  Blood type: O pos, Rubella immune, GBS positive, HIV neg, RPR negative, Nml 1 hr  plt 299 Hct 38     Vaccines:   Flu: Yes   Tdap: Yes   COVID: Yes    PMH:    Past Medical History:   Diagnosis Date   • Allergy    • Asthma    • Psychiatric disorder     depression, bipolar     SxHx:   Past Surgical History:   Procedure Laterality Date   • MYRINGOTOMY       Social hx:   Social History     Tobacco Use   • Smoking status: Never Smoker   • Smokeless tobacco: Never Used   Vaping Use   • Vaping Use: Never used   Substance Use Topics   • Alcohol use: Not Currently   • Drug use: No     Allergies:   Allergies   Allergen Reactions   • Nitroglycerin      Heart stops     • Sulfa Drugs Diarrhea and Nausea     Sick for weeks       OB/GYN Hx:   OB History    Para Term  AB Living   2 0 0 0 1 0   SAB IAB Ectopic Molar Multiple Live Births   1 0 0 0 0 0      # Outcome Date GA Lbr Florin/2nd Weight Sex Delivery Anes PTL Lv   2 Current            1 SAB                 O:   Vitals:    21 2300   BP:    Pulse:    Resp:    Temp: 37 °C (98.6 °F)   SpO2:      GEN: NAD  Abd: soft, NT, gravid  ExtL soft, NT, nml edema  SVE: 10/100/+1, SROM clear fluid  FHT: Cat 1 reactive and reassuring  TOCO:  q 1-3 mins    Labs:   Recent Labs     21  2110   WBC 11.2*   RBC 4.17*   HEMOGLOBIN 11.5*   HEMATOCRIT 34.2*   MCV 82.0   MCH 27.6   MCHC 33.6   RDW 38.5   PLATELETCT 279   MPV 10.8       Lab Results   Component Value Date    RBC 4.17 (L) 2021    ABOGROUP O 2021    RH POS 2021       A/P: 21 y.o. yo   at 38w2d in active labor, reassuring fetal surveillance now complete   - Admit to L&D  - Routine labs reviewed  - Pain management reviewed-s/p epidural and feeling well, feeling pressure  -Will start pushing now  - Continue close observation

## 2021-12-08 NOTE — CARE PLAN
The patient is Stable - Low risk of patient condition declining or worsening    Shift Goals  Clinical Goals: VSS,light lochia,breast feed  Patient Goals: rest,breast feed  Family Goals: rest    Progress made toward(s) clinical / shift goals:  progressing    Patient is not progressing towards the following goals:

## 2021-12-08 NOTE — PROGRESS NOTES
Assessed patient, fundus firm, lochia light, she is declining pain medicine and will call for it when needed.  Discussed plan on care and benefits of skin to skin with .

## 2021-12-08 NOTE — ANESTHESIA TIME REPORT
Anesthesia Start and Stop Event Times     Date Time Event    12/7/2021 2134 Ready for Procedure     2135 Anesthesia Start    12/8/2021 0201 Anesthesia Stop        Responsible Staff  12/07/21 to 12/08/21    Name Role Begin End    Laurie Cabezas M.D. Anesth 2135 0201        Preop Diagnosis (Free Text):  Pre-op Diagnosis             Preop Diagnosis (Codes):    Premium Reason  A. 3PM - 7AM    Comments:

## 2021-12-08 NOTE — ANESTHESIA POSTPROCEDURE EVALUATION
Patient: Chyna Bond    Procedure Summary     Date: 12/07/21 Room / Location:     Anesthesia Start: 2135 Anesthesia Stop: 12/08/21 0201    Procedure: Labor Epidural Diagnosis:     Scheduled Providers:  Responsible Provider: Laurie Cabezas M.D.    Anesthesia Type: epidural ASA Status: 2          Final Anesthesia Type: epidural  Last vitals  BP   Blood Pressure: 113/64    Temp   36.6 °C (97.9 °F)    Pulse   100   Resp   19    SpO2   95 %      Anesthesia Post Evaluation    Patient location during evaluation: floor  Patient participation: complete - patient participated  Level of consciousness: awake and alert  Pain score: 0    Airway patency: patent  Anesthetic complications: no  Cardiovascular status: hemodynamically stable  Respiratory status: acceptable  Hydration status: euvolemic    PONV: none          No complications documented.     Nurse Pain Score: 2 (NPRS)

## 2021-12-08 NOTE — CARE PLAN
The patient is Stable - Low risk of patient condition declining or worsening    Shift Goals  Clinical Goals: Make cervical change    Progress made toward(s) clinical / shift goals:    Problem: Knowledge Deficit - L&D  Goal: Patient and family/caregivers will demonstrate understanding of plan of care, disease process/condition, diagnostic tests and medications  Outcome: Progressing     Problem: Risk for Infection and Impaired Wound Healing  Goal: Patient will remain free from infection  Outcome: Progressing  Goal: Patient's wound/surgical incision will decrease in size and heals properly  Outcome: Progressing     Problem: Risk for Injury  Goal: Patient and fetus will be free of preventable injury/complications  Outcome: Progressing     Problem: Pain  Goal: Patient's pain will be alleviated or reduced to the patient’s comfort goal  Outcome: Progressing

## 2021-12-08 NOTE — PROGRESS NOTES
Late Entry due to pt care:  Pt is a , YAMINI 20, 38.1 wks today presenting to triage with c/o UC's. Reports +FM, -LOF, -VB. Staes low TORRI with this pregnancy as well as history of HSV in which she takes acyclovir TID.   Pt states she is feeling pressure and nausea with UC's.   SVE by RN, see flowsheets.   - Dr Pineda telephoned and given report. Orders received to admit this pt to labor and delivery.   - Pt transferred via wheelchair to S215. Bedside report given to Carlene KAY. POC discussed. Questions answered.

## 2021-12-08 NOTE — TELEPHONE ENCOUNTER
Lactation called stating if we can put in an order for pt's breast pump, message was sent to Dr. Suggs, will fax it over once done with # [phone]

## 2021-12-08 NOTE — PROGRESS NOTES
- Report received from Berna KAY.      - Dr. Cabezas at bedside to place epidural.       - Test dose administered by physcian, patient reports no adverse effects, vital signs within normal limits at this time.     2315 - SVE as noted in flowsheets.    2337 - Dr. Pineda contacted regarding patient labor status: C/+1    0015 - Dr. Pineda evaluating pushing effort at bedside.     0140 - Dr. Pineda contacted to return to bedside to evaluate FHR and pushing efforts.     0201 -  of viable male infant. APGARs 8/9.     0203 - Spontaneous delivery of intact placenta.     0330 - Report given to Radha KAY. POC dicussed.

## 2021-12-08 NOTE — ANESTHESIA PREPROCEDURE EVALUATION
Date: 12/07/21  Procedure: Labor Epidural         Relevant Problems   PULMONARY   (positive) Mild intermittent asthma without complication       Physical Exam    Airway   Mallampati: II  TM distance: >3 FB  Neck ROM: full       Cardiovascular - normal exam  Rhythm: regular  Rate: normal  (-) murmur     Dental - normal exam           Pulmonary - normal exam  Breath sounds clear to auscultation     Abdominal    Neurological - normal exam                 Anesthesia Plan    ASA 2       Plan - epidural   Neuraxial block will be labor analgesia            Induction: intravenous    Postoperative Plan: Postoperative administration of opioids is intended.    Pertinent diagnostic labs and testing reviewed    Informed Consent:    Anesthetic plan and risks discussed with patient.    Use of blood products discussed with: patient whom consented to blood products.

## 2021-12-08 NOTE — PROGRESS NOTES
Patient admited to postpartum from L&D via wheelchair. IV patent and infusing second bag of LR with pitocin.armbands verified with infant and FOB. Fundus firm at umbilicus with light lochia. VSS. Oriented to surroundings and use of call light,ice pack,tucks and spray,filling out birth certificate and EPDS forms,recording infant's feedings,intake and output,feeding infant every 2-3 hours and to call if going to toilet for first time. Or for any needs. Patient was medicated in LnD prior to transfer and only requested ice pack for perineum which was given. No further needs at this time.

## 2021-12-08 NOTE — L&D DELIVERY NOTE
Summary: Pt was pushing well but due to low fetal heart rate a mediolateral episiotomy was completed to expedite delivery. With the next contraction the patient spontaneously delivered a viable male infant with Apgars 8 /9 weighing pending gm in OA position; body delivered without difficulty. Infant was placed on mother's chest. Delayed cord clamping was completed and then the the cord was clamped and cut.  Placenta spontaneously delivered, intact. Fundus was firm with minimal bleeding. Vagina and perineum inspected- mediolateral episiotomy and small 2nd degree laceration was seen and repaired under epidural with 2-0 Vicryl in usual fashion. 2 small periurethral lacerations were seen but hemostatic and not repaired. Excellent hemostasis was achieved. . Mother and baby doing well.

## 2021-12-08 NOTE — PROGRESS NOTES
0330) Report received from Carlene SPARKS RN, POC discussed, assumed care  0400) Report to Taylor SOTELO RN, POC discussed

## 2021-12-08 NOTE — ANESTHESIA PROCEDURE NOTES
Epidural Block    Date/Time: 12/7/2021 9:40 PM  Performed by: Laurie Cabezas M.D.  Authorized by: Laurie Cabezas M.D.     Patient Location:  OB  Start Time:  12/7/2021 9:40 PM  End Time:  12/7/2021 9:45 PM  Reason for Block: labor analgesia    patient identified, IV checked, site marked, risks and benefits discussed, surgical consent, monitors and equipment checked, pre-op evaluation and timeout performed    Patient Position:  Sitting  Prep: ChloraPrep, patient draped and sterile technique    Monitoring:  Blood pressure, continuous pulse oximetry and heart rate  Approach:  Midline  Location:  L2-L3  Injection Technique:  DELGADO saline  Skin infiltration:  Lidocaine  Strength:  1%  Dose:  3ml  Needle Type:  Tuohy  Needle Gauge:  17 G  Needle Length:  3.5 in  Loss of resistance::  5.5  Catheter Size:  19 G  Catheter at Skin Depth:  12  Test Dose Result:  Negative

## 2021-12-08 NOTE — LACTATION NOTE
Discussed breastfeeding plan with mother, she agreed to have her nurse observe the next feeding. She feels that baby is basically learning how to latch.      Encouraged her to spend time skin to skin with baby and ask for assistance if needed.We discussed normal  behaviors and I assured her that short feedings were normal in the first couple of days as she voiced concern that he was only feeding for 7-10 minutes.    He is 11 hours old and has 3 feedings so far, again assuring her this is actually very good for him being so new.

## 2021-12-08 NOTE — CARE PLAN
Problem: Psychosocial - L&D  Goal: Patient's level of anxiety will decrease  Outcome: Progressing  Goal: Patient will be able to discuss coping skills during hospitalization  Outcome: Progressing   The patient is Stable - Low risk of patient condition declining or worsening    Shift Goals  Clinical Goals: VSS,light lochia,breast feed  Patient Goals: rest,breast feed  Family Goals: rest    Progress made toward(s) clinical / shift goals:      Patient is not progressing towards the following goals:

## 2021-12-09 ENCOUNTER — PHARMACY VISIT (OUTPATIENT)
Dept: PHARMACY | Facility: MEDICAL CENTER | Age: 21
End: 2021-12-09
Payer: COMMERCIAL

## 2021-12-09 PROCEDURE — 700102 HCHG RX REV CODE 250 W/ 637 OVERRIDE(OP): Performed by: OBSTETRICS & GYNECOLOGY

## 2021-12-09 PROCEDURE — A9270 NON-COVERED ITEM OR SERVICE: HCPCS | Performed by: OBSTETRICS & GYNECOLOGY

## 2021-12-09 PROCEDURE — 59400 OBSTETRICAL CARE: CPT | Performed by: OBSTETRICS & GYNECOLOGY

## 2021-12-09 PROCEDURE — 770002 HCHG ROOM/CARE - OB PRIVATE (112)

## 2021-12-09 PROCEDURE — RXMED WILLOW AMBULATORY MEDICATION CHARGE: Performed by: OBSTETRICS & GYNECOLOGY

## 2021-12-09 RX ORDER — IBUPROFEN 600 MG/1
600 TABLET ORAL EVERY 6 HOURS PRN
Qty: 30 TABLET | Refills: 1 | Status: SHIPPED | OUTPATIENT
Start: 2021-12-09

## 2021-12-09 RX ORDER — BREAST PUMP
1 EACH MISCELLANEOUS DAILY
Qty: 1 EACH | Refills: 0 | Status: SHIPPED | OUTPATIENT
Start: 2021-12-09

## 2021-12-09 RX ADMIN — IBUPROFEN 600 MG: 600 TABLET, FILM COATED ORAL at 23:13

## 2021-12-09 RX ADMIN — IBUPROFEN 600 MG: 600 TABLET, FILM COATED ORAL at 16:15

## 2021-12-09 RX ADMIN — IBUPROFEN 600 MG: 600 TABLET, FILM COATED ORAL at 03:47

## 2021-12-09 ASSESSMENT — EDINBURGH POSTNATAL DEPRESSION SCALE (EPDS)
I HAVE BEEN ABLE TO LAUGH AND SEE THE FUNNY SIDE OF THINGS: AS MUCH AS I ALWAYS COULD
I HAVE FELT SCARED OR PANICKY FOR NO GOOD REASON: YES, SOMETIMES
THE THOUGHT OF HARMING MYSELF HAS OCCURRED TO ME: NEVER
I HAVE FELT SAD OR MISERABLE: NOT VERY OFTEN
I HAVE BLAMED MYSELF UNNECESSARILY WHEN THINGS WENT WRONG: NOT VERY OFTEN
I HAVE BEEN SO UNHAPPY THAT I HAVE BEEN CRYING: ONLY OCCASIONALLY
THINGS HAVE BEEN GETTING ON TOP OF ME: NO, MOST OF THE TIME I HAVE COPED QUITE WELL
I HAVE LOOKED FORWARD WITH ENJOYMENT TO THINGS: RATHER LESS THAN I USED TO
I HAVE BEEN ANXIOUS OR WORRIED FOR NO GOOD REASON: YES, SOMETIMES
I HAVE BEEN SO UNHAPPY THAT I HAVE HAD DIFFICULTY SLEEPING: NOT VERY OFTEN

## 2021-12-09 ASSESSMENT — PAIN DESCRIPTION - PAIN TYPE: TYPE: ACUTE PAIN

## 2021-12-09 NOTE — CARE PLAN
The patient is Stable - Low risk of patient condition declining or worsening    Shift Goals  Clinical Goals: Patient will maintain stabls VS; Pain will be WDR; Lochia WDR  Patient Goals: breastfeeding  Family Goals: rest    Progress made toward(s) clinical / shift goals:  *  Problem: Risk for Excess Fluid Volume  Goal: Patient will demonstrate pulse, blood pressure and neurologic signs within expected ranges and without any respiratory complications  Outcome: Progressing     Problem: Knowledge Deficit - Standard  Goal: Patient and family/care givers will demonstrate understanding of plan of care, disease process/condition, diagnostic tests and medications  Outcome: Progressing     Problem: Pain - Standard  Goal: Alleviation of pain or a reduction in pain to the patient’s comfort goal  Outcome: Progressing     Problem: Knowledge Deficit - Postpartum  Goal: Patient will verbalize and demonstrate understanding of self and infant care  Outcome: Progressing     Problem: Psychosocial - Postpartum  Goal: Patient will verbalize and demonstrate effective bonding and parenting behavior  Outcome: Progressing     Problem: Altered Physiologic Condition  Goal: Patient physiologically stable as evidenced by normal lochia, palpable uterine involution and vitals within normal limits  Outcome: Progressing     Problem: Infection - Postpartum  Goal: Postpartum patient will be free of signs and symptoms of infection  Outcome: Progressing   **    Patient is not progressing towards the following goals:  Patient scored 10 on PPDS, Dr. Suggs aware.

## 2021-12-09 NOTE — PROGRESS NOTES
2030 Assessment completed. Lochia light, fundus firm. Plan of care reviewed. Patient reports pain of 4/10, see MAR for intervention, will call if additional pain intervention needed.   0521 Patient scored a 10 on the [place]  Depression Screen. Dr. Suggs aware, states she will be in today to assess the patient.

## 2021-12-09 NOTE — PROGRESS NOTES
PPD# 1     S/P     S- doing well, no complaints. Ambulating. Voiding without difficulties. Tolerating regular diet. Breast feeding/pumping. Reports normal lochia. Adequate pain control with ibuprofen.     O-   Vitals:    21 1400 21 2200 21 0200 21 0600   BP: 118/81 125/87 119/75 116/80   Pulse: 88 99 87 83   Resp: 17 18 17 17   Temp: 36.9 °C (98.4 °F) 36.6 °C (97.9 °F) 37 °C (98.6 °F) 36.6 °C (97.9 °F)   TempSrc: Temporal Temporal Temporal Temporal   SpO2: 96% 100% 96% 94%   Weight:       Height:         Abdomen: soft, ND, NT; fundus firm below umbulicus    Recent Labs     21  2110 21  1439   HEMOGLOBIN 11.5* 10.9*   HEMATOCRIT 34.2* 32.9*   MCV 82.0 82.7   MCH 27.6 27.4   PLATELETCT 279 253         A- S/P  doing well  +GBS culture and peds keeping baby until tomorrow    P- D/C home with baby tomorrow am       Follow up 6 weeks       Pelvic rest 6 weeks       Continue PNVs while breastfeeding       Ibuprofen RX written    Jeanne Suggs MD

## 2021-12-09 NOTE — DISCHARGE PLANNING
Discharge Planning Assessment Post Partum    Reason for Referral: MOB scored a 10 on the EPDS screen  Address: [address] [place] [address] [place] [address]  Phone: [phone]  Type of Living Situation: living with FOB  Mom Diagnosis: Pregnancy  Baby Diagnosis: -38.2 weeks  Primary Language: English    Name of Baby: Eloy Kang (: 21)  Father of the Baby: Clark Kang   Involved in baby’s care? Yes  Contact Information: [phone]    Prenatal Care: Yes  Mom's PCP: TONA Crook  PCP for new baby: Dr. Severino    Support System: FOB  Coping/Bonding between mother & baby: Yes  Source of Feeding: breast feeding  Supplies for Infant: prepared for infant; denies any needs    Mom's Insurance: [place] [employer]  Baby Covered on Insurance:Yes  Mother Employed/School: [employer]  Other children in the home/names & ages: No, first baby    Financial Hardship/Income: No   Mom's Mental status: alert and oriented  Services used prior to admit: None    CPS History: No  Psychiatric History: No-MOB scored a 10 on the EPDS screen.  Provided a list of counseling and support group resources specializing in maternal mental health  Domestic Violence History: No  Drug/ETOH History: No    Resources Provided: post partum support and counseling resources provided to mother  Referrals Made: None     Clearance for Discharge: Infant is cleared to discharge home with parents

## 2021-12-09 NOTE — LACTATION NOTE
This note was copied from a baby's chart.  Mom is a 20 y/o P1 who delivered baby boy weighing  7 # 7.9 oz at 38.1 wks.   1100 : Mom was in the shower during first LC visit. Spoke with FOB who reported that mother fed for about 4 minutes then slept. LC encouraged to call after mom is out of the shower for assist to opposite breast.    1430 :  Mom asleep in bed. FOB stated that mom pumped 30 mls and baby was given 13 mls and the remaining 17 cc was saved at bedside. FOB has asked that a sign be put on the door and mom be allowed to sleep until next feed. LC placed sign on door and highly encouraged parents to call for next feeding and LC RN or RN will help assist with latch, educated and clarify plan.

## 2021-12-10 VITALS
SYSTOLIC BLOOD PRESSURE: 112 MMHG | HEART RATE: 86 BPM | OXYGEN SATURATION: 96 % | BODY MASS INDEX: 28.79 KG/M2 | DIASTOLIC BLOOD PRESSURE: 81 MMHG | RESPIRATION RATE: 17 BRPM | HEIGHT: 68 IN | TEMPERATURE: 97.5 F | WEIGHT: 190 LBS

## 2021-12-10 PROCEDURE — 700102 HCHG RX REV CODE 250 W/ 637 OVERRIDE(OP): Performed by: OBSTETRICS & GYNECOLOGY

## 2021-12-10 PROCEDURE — A9270 NON-COVERED ITEM OR SERVICE: HCPCS | Performed by: OBSTETRICS & GYNECOLOGY

## 2021-12-10 PROCEDURE — 99999 PR NO CHARGE: CPT | Performed by: OBSTETRICS & GYNECOLOGY

## 2021-12-10 RX ADMIN — IBUPROFEN 600 MG: 600 TABLET, FILM COATED ORAL at 11:06

## 2021-12-10 RX ADMIN — IBUPROFEN 600 MG: 600 TABLET, FILM COATED ORAL at 05:33

## 2021-12-10 ASSESSMENT — PAIN DESCRIPTION - PAIN TYPE: TYPE: ACUTE PAIN

## 2021-12-10 NOTE — CARE PLAN
The patient is Stable - Low risk of patient condition declining or worsening    Shift Goals  Clinical Goals: Patient will maintain stable VS; Pain WDR; Lochia WDR  Patient Goals: breastfeeding  Family Goals: rest    Progress made toward(s) clinical / shift goals:    Problem: Risk for Excess Fluid Volume  Goal: Patient will demonstrate pulse, blood pressure and neurologic signs within expected ranges and without any respiratory complications  Outcome: Progressing     Problem: Knowledge Deficit - Standard  Goal: Patient and family/care givers will demonstrate understanding of plan of care, disease process/condition, diagnostic tests and medications  Outcome: Progressing     Problem: Pain - Standard  Goal: Alleviation of pain or a reduction in pain to the patient’s comfort goal  Outcome: Progressing     Problem: Knowledge Deficit - Postpartum  Goal: Patient will verbalize and demonstrate understanding of self and infant care  Outcome: Progressing     Problem: Psychosocial - Postpartum  Goal: Patient will verbalize and demonstrate effective bonding and parenting behavior  Outcome: Progressing     Problem: Altered Physiologic Condition  Goal: Patient physiologically stable as evidenced by normal lochia, palpable uterine involution and vitals within normal limits  Outcome: Progressing     Problem: Infection - Postpartum  Goal: Postpartum patient will be free of signs and symptoms of infection  Outcome: Progressing

## 2021-12-10 NOTE — PROGRESS NOTES
1945 Assessment completed. Lochia light, fundus firm. Plan of care reviewed. Declines pain intervention at this time, will call if pain intervention needed.

## 2021-12-10 NOTE — LACTATION NOTE
This note was copied from a baby's chart.  LC follow up visit : Bedside RN called for LC assist with latching. Mom sat in upright position in bed. LC unwrapped baby and placed cross cradle on left side. Mom states baby has fed more on right and not on left. Baby rooting at breast but not able to grasp breast. LC had mom roll nipple out with forefinger and thumb. Mom able to easily express colostrum. Mom has 17mls of EXBM at bedside to offer if baby does not latch.  Tea cup hold shown to mom . Baby will iveth and sweep at breast but not extend tongue. Mom continues to express into baby's mouth. Baby has many burps that result in minimal regurgitations in throat. Baby able to clear secretions easily and swallow. LC demonstrated burping to parents. Baby began to fuss after about 20 minutes of attempting latch and was placed skin to skin on mom. Baby immediately starting moving down  towards left breast and paused and repeated this process. LC then had mom hand express again after baby showing more cues and remained alert. Once baby started bobbing and sweeping LC assisted on to left breast and hand massaged until baby started with effective jaw glide and swallowing pattern. Reviewed demand feeds with mom and when to supplement baby as needed. LC will follow up in the morning with discharge plan and feeding evaluation. Mom has a personal pump at home. Mom aware of outpatient breast feeding resources.

## 2021-12-10 NOTE — PROGRESS NOTES
Pt assessed, fundus firm, lochia light. No s/s of distress. Bonding well w/ infant. Fob @ bs, pt up and voiding w/o difficulty. Denies pain at this time, discussed discharge today.

## 2021-12-10 NOTE — LACTATION NOTE
This note was copied from a baby's chart.  LC follow up visit. Mom reports baby fed at 08-8:15 for 9 minutes on left side. Baby was given 10 mls of EXBM after feeding. Baby currently swaddled and sleeping. LC placed baby in bassinet and mom will eat breakfast. Parents will be discharged home to day. Reviewed basic feeding plan of cue based feedings of 8 or more times in 24 hours and observing baby's feeding behavior versus the clock. Mom aware of watching for voids and stools. No concerns at this time. [place] [facility] [place]  Mom will call for any questions or assist needed before discharge.

## 2021-12-10 NOTE — CARE PLAN
The patient is Stable - Low risk of patient condition declining or worsening    Shift Goals  Clinical Goals: Patient will maintain stable VS; Pain WDR; Lochia WDR  Patient Goals: breastfeeding  Family Goals: rest    Progress made toward(s) clinical / shift goals:  Patient up and caring for infant and self. Anticipated discharge today. Bleeding light an pain controlled.    Patient is not progressing towards the following goals:

## 2021-12-10 NOTE — PROGRESS NOTES
At approximately this time reviewed discharge paperwork and follow up appointments with patient significant other and family member who were at bedside. Reviewed prescriptions and infant care. Verbalized understanding. Removed cuddles tag, advised patient to call upon placement of infant in car seat for primary RN to verify bands and check car seat safety.

## 2021-12-13 ENCOUNTER — OFFICE VISIT (OUTPATIENT)
Dept: OBGYN | Facility: CLINIC | Age: 21
End: 2021-12-13
Payer: COMMERCIAL

## 2021-12-13 DIAGNOSIS — O92.70 LACTATION PROBLEM: ICD-10-CM

## 2021-12-13 DIAGNOSIS — O92.79 ENGORGEMENT OF BREAST ASSOCIATED WITH CHILDBIRTH, POSTPARTUM: ICD-10-CM

## 2021-12-13 PROCEDURE — 99215 OFFICE O/P EST HI 40 MIN: CPT | Performed by: NURSE PRACTITIONER

## 2021-12-13 NOTE — PROGRESS NOTES
Summary: Difficulty breastfeeding in the hospital, started pumping at 24 hours, first time 60ml after that 20ml. Now making 4-5 ounces after breastfeeding with Spectra. Dad giving one bottle per night for mom to sleep. Breasts engorged, uncomfortable, more so on the left. Using a nipple shield with success. 45oz in the freezer, day 5.   Today: Baby had  and mom pumped about 2.5hours earlier. To left side, bare breast not sustained, NS baby sustained easily, removed 27ml then after diaper change back to same breast for an additional 21ml and content. Peri/Haakaa on the right side to catch flow, no suction. Removed less than an ounce. Breast full, encouraging less pumping.  Plan:Continue with exclusive breastfeeding, dad one bottle at night. Reduce pumping through full drainage once in the morning or one sided nursing with relief to the other side with peri. May pump double after feeding but decrease duration on the pump. Over the next 2-3 days start decreasing frequency, one feeding at a time.  Follow up:  Lactation appointment :8-10 days as needed  Pediatrician appointment:2 week [facility]  Referrals :None    Subjective:   Chyna Bond is a 21 y.o. female here for lactation care. She is here today with baby and  ([place]).    Concerns:   Latch on difficulties , engorgement, breast pain , oversupply  and sleepy baby at the breast, jaundice    HPI:   Pertinent  history:   Mother does not have a history of advanced maternal age, GDM, hypertension prior to pregnancy, insulin resistance, multiple gestation, PCOS and thyroid disease. Common condition(s) which may interfere with milk supply.    FEEDING HISTORY:    Past breastfeeding history:  First baby   Hospital course: Difficulty breastfeeding in the hospital, started pumping at 24 hours, first time 60ml after that 20ml.  Currently 2021 Now making 4-5 ounces after breastfeeding with Spectra. Dad giving one bottle per night for  mom to sleep. Breasts engorged, uncomfortable, more so on the left. Using a nipple shield with success. 45oz in the freezer, day 5.      Both breasts: Yes  Bottle feeds: 1x/24h    Supplement: Supplementation initiated inpatient and Expressed breast milk  Quantity: 45ml  How given/devices:  Bottle    Nipple Shield Use: 24 mm  Recommended by: IP  When started? Day 2    Breast Pumping:   Frequency: Each feeding 8x/day  Type of pump: Spectra  Flange size/type: 24mm  NO pain with pumping    Maternal ROS:  Constitutional: No fever, chills. Feeling well  Breasts: No soreness of nipples and Soreness of breasts  Psychiatric: Managing ok  Mental Health: No mention of feeling irritable, agitated, angry, overwhelmed, apathy, exhaustion nor having sleep changes outside infant feeds/demands or appetite changes     Objective:     Maternal Physical   General: No acute distress  Breasts: Symetrical , Full, Engorged, Pain: , Plugged Duct - no evidence and Mastitis  - no S/S  Nipples: intact  Psychiatric:  Normal mood and affect. Her behavior is normal. Judgment and thought content normal   Mental Health:  Did NOT exhibit sadness, crying, feeling overwhelmed, agitation or hypervigilance.     Assessment/Plan & Lactation Counseling:     Infant exam on infant chart    Infant Weight History:   12/8/21 7#7.9oz  12/9/21 7#0oz  12/11/12  6#15.1oz  12/13/2021 7#1.5oz  Infant intake at Breast:: 27ml + 21ml left only;         Total: 48ml  Milk Transfer at this feeding:   Effective breastfeeding  Pumped: Type of Pump: Spectra    Pumping not indicated  Initiation of Feeding: Infant initiates  Position of Feeding:    Left: football  Attachment Achieved: rapidly with NS, Bare breast not sustained  Nipple shield:     Size: 24mm       Introduced IP  Latch achieved yes  Suck Pattern at the breast: Suck burst and normal rest  Behavior Following Observed Feeding: sleeping  Nipple Pain: None     Latch: Mom latches independently and Latch difficulty  without nipple shield  Suckling/Feeding: attaches, audible swallows, baby fed effectively, baby roots, elicits DONNELL and rhythmic  Milk Supply Available: oversupply      Maternal Diagnosis/Problem:  Lactation problem, managing oversupply  Engorgement    BREASTFEEDING PLAN  Discussed concerns and symptoms as listed above in assessment and guidance summarized below.  Shared decision making was used between myself and the family for this encounter, home care, and follow up.  Topics reviewed included:  •  Herbs and medications  A galactagogue is an herb or medication taken by a breastfeeding mother to decrease her milk supply in your situation. We know that for centuries mothers around the world have sought out remedies to increase their milk supply. However, there is limited research on the safety and effectiveness of herbal galactagogues, which makes it hard for us to endorse them. It is not known if any of these herbs are truly effective, and it is difficult to predict how a specific herbal galactagogue will affect an individual, requiring “trial and error” in many situations. When effective, results are generally seen within 24-72 hours of starting an herbal galactagogue.   •  The nature of infants oral head/neck structure and function and its impact on latch and transfer of milk.   o Discussed Mechanical forces resulting in strain patterns during intrauterine life and during birth may negatively affect the oral-motor function of the  and compromise structure and function.  Joint restriction or hypo-mobility could be a logical progression following the relative lack of mobility during the last crowded months of gestation. An exceptionally flexed or rotated fetal posture may tighten myofascial structures in the neck, restricting the hyoid bone and strap muscles that support an effective swallow. More research reveals the body as an integrated whole via its major structure-maintaining and sensory organ, the  fascia.  Other musculoskeletal issues, such as neck preferences, may also affect an infant's ability to nurse. These views have expanded and deepened over time.   • .Neck preference this is a normal  finding that should correct itself over the next few weeks.    o However when there is a neck preference it can make latching more difficult.    o Mom can  use cross cradle on the left and football on the right    o Infant head can be supported in the car seat.    o Bottle feeding baby's can be encouraged to look to the opposite side of the preference  o Infant can be can talked to from the side encouraging baby to turn to.   •  Maternal Mental Health: Having a new baby can be joyful time for some new moms, but a difficult time for others. For all, it is a time of profound physical and emotional change. Balancing baby, family, partners and friends, work, pets, and preexisting or new life stressors as well as sleep deprivation can be extremely challenging. Untreated depression and anxiety can contribute to early cessation of breastfeeding, so it is important both for the mental health and physical health of new moms and babies to get on the path to feeling better. Resources abound, please ask someone if interested.  • Sleep or lack of: discussed strategies to manage restorative sleep, although short amounts, significant to the mental health of the mother. Parents already implemented a sleeping support for each other  • Hyperlactation (Oversupply) This is a high rate of milk production often causing breast discomfort and or compelling moms to express beyond what the baby is taking.There is no defined clinical criteria. Infant can present with stopping to suckle/letting go, milk spraying in the baby's face, baby coughing or choking or breast refusal, struggle with initial letdown with gasping, fussiness, rapid weight gain (1# a week), excessive gas and refusing the second breast or breast with larger supply.    o Causes:   - Mother induced with pumping, frequent HaaKaa use  - Sawyer phenomenon breasts don't respond to feedback of fullness  - Large storage capacity  o Management  - Block feeding discussed, one side each feeding supporting other side with Haakaa or pumping, limited duration  - Feed from 1 breast for 3 hour block of time  - Noticeable drop in supply by 36-48 hours  - If resting side too full, pump MINIMALLY to comfort  - Full drainage in the morning  - Follow up weight necessary while deliberately decreasing supply   • Milk supply is dependent on glandular tissue development, hormonal influences, how many times the baby removes milk and how well the breasts are emptied in a 24 hour period. This is a biological reality that we can NOT work around. If, for any reason, your baby is not latching, or you are not able to nurse, then it is important for you to remove the milk instead by pumping or hand expression.  There's no magic trick, tea, food, drink, cookie or supplement that will increase your milk supply. One  must  effectively remove milk to continue to make and maximize milk. In the early days and weeks that can be 8+ times in 24 hours. For older babies, on average 6-7 + times in 24 hours.      •  Feeding:   o Feed your baby every 1.5-3 hours, more often if baby acts hungry.   o Awaken baby for feeding if going over 3 hours in the day.   o Until back to birth weight, ONE four hour at night is acceptable if has had 8 prior feedings in 24 hours.    o Need to get in 8-12 feedings per 24 hours.   o  Given infants weight you may allow baby to go longer at night but that generally means shorter durations in the day.    •  Supplement:   • No supplement is needed    •  When bottle-feeding, there are three primary things to consider:    o Nipple Shape:  - Look for a nipple that looks like a “breast at work” not a “breast at rest.”  A “breast at work” has a somewhat cone shape as the nipple and breast tissue is  pulled into the baby’s mouth while feeding.  Your baby’s mouth should be able to go around the widest part of the nipple to form a wide-open gape on the bottle like that of a good latch at the breast. In contrast, a breast at rest might look more like, well, a breast: a roundish base with a  nipple.  If the bottle looks like this, your baby’s lips may not be able to get around the widest part of the nipple because it is just too wide resulting in a narrow gape that would hurt your nipple. This nipple shape may also make it difficult for your baby to make a complete seal with their lips which leads to air intake and milk spillage.Wide or narrow neck bottles are your choice.   o Flow Rate of the Nipple:  - The nipple flow should be slow.  Don’t just read the label, but notice how your baby is feeding and trust what you observe.  A study done a few years ago found that “slow flow” varied widely between brands and even between nipples of the same brand.  Try several nipples until you find one that results in a rhythmic sucking pattern but not chugging and gulping.  o Pacing the feeding:  - A slow flow nipple helps, but how you feed the baby is more important.  Good positioning can compensate for a faster flow nipple.  When bottle-feeding, the baby should control how much is consumed at a feeding.  Holding the baby in an upright position with the bottle horizontal ensures that the baby gets milk only when sucking.  Here is a nice video demonstrating this concept of paced bottle feeding,  https://www.youtube.com/watch?v=AiZMV3eMV1Q    •  Pacifier Use:  The American [employer]y of Pediatitians' Position Paper reports: Although we recommend a conservative approach regarding pacifier use, we do not endorse a complete ban on the use of pacifiers, nor do we support an approach that induces parental guilt concerning their choices about the use of pacifiers.      •  Nipple shield (NS): We prefer the 24mm Medela.   o Before  applying, roll the shield in on itself (like a sombrero, and allow breast to be pulled  in to the shield tip.   o The latch is very different from the bare breast, bring the baby to you and let them find the nipple shield and work it out.   o Once you and your baby are familiar with the NS, you may be able to just put it on the breast and latch the baby without any preparation.    • Positioning Techniques for bare breast  o Suggested positions Cross cradle, Football and Side lying  o Fine tune position by making sure your fingers beneath the breast as well as your bra, are out of the way of your baby's chin.  o Positioning:  Many positions shown, great sidelying at 7 minutes.   o See http://globalhealthmedia.org/portfolio-items/positions-for-breastfeeding/?qgnemqvlmCN=58507    •  Latch on Techniques for bare breast.   o Modify for nipple shield use soon enough you will move back to bare breast.  o Fine tune latch:  - By holding your baby more securely at the breast, assisting your baby to stay attached by:  • Bringing your baby to your breast, not breast to the baby  • Your baby's cheek to touch breast securely, nose tipped back  • Hold your baby firmly in place so when your baby forgets to suck and picks it back up again your baby is in the correct spot. You will be extinguishing behavior and replacing it with a deeper latch to stimulate suck and provide satisfaction at the breast.  • Your baby needs as much breast as deep in the mouth as possible to allow your nipples to heal and for you more importantly to maximize efficiency at the breast  • Latch is asymmetrical, leading with the chin, getting the baby below the breast, as if offering a large   •  Pumping Guidelines:  o Both breasts if not feeding  o One breast if fed the other side and uncomfortable   o Pump less duration and decrease frequency by 1 time in 24 hours every 1-3 days  Type of pump:  o Spectra Settings are correct  o How long will vary woman to  woman, typically 8-15 minutes, or 1-2 minutes after flow slows  o Flange Fit: Freely moving nipple in the tunnel with some movement of the areola.    • Storage (Acceptable guidelines for healthy term babies)  o 10 hours at room temp including your pieces, may rinse but not mandatory  o 8 days refrigerator, don't need  to refrigerate right away if using fresh pumped milk at the next feeding  o Freezer 1 year  o Deep freezer 2 years  o American Academy or Pediatrics has said you may mix different temperature milks.   o If baby drinks breastmilk from a fresh or refrigerated bottle of breastmilk,  you may return the unused portion to the refrigerator and use once at next feeding.   •  Breast Care  Engorgement:     Heat before feeding or pumping   Cold 20 minutes on 20 min off repeat as desired   Ibuprofen 600mg every 6 hours for 72 hours   Minimal massage or vibration    Goal: to decrease inflammation   Reviewed signs and symptoms of Mastitis    •  Connect with other mothers:  o [facility]:   - [facility]: [email or web address].[email or web address]  - Well-Nourished Babies (Private group for questions and support): [email or web address]/groups/004704156447718/  o [facility]  - Tuesdays 10am - 11am. Women's Health at [address], 3rd floor conference room  - Come and check your baby's weight, do a feeding and see how your baby is growing, visit with other mothers, plan on a walk or coffee date after group.  • Please wear a mask  • Due to space limitations - no strollers please (Fresh c/section moms should use the stroller)  • We would love to have dads stay, but moms won't breastfeed.  • The room is only available from 10am -11am, there is a meeting prior and after.   • All diapers must be taken with you   o Breastfeeding [facility]  - This is an emotional, informational and safe support [place] with your like-minded community that builds solidarity among moms  - The honest  experiences of mothers are highly valued among the other mothers  - Tuesday  at 11am by Seda,  you will be sent an invitation each week, please unsubscribe as you wish  - You may instead copy and paste this link: [email or web address]    - Passcode  [id number]  - You may share this link with friends; please don't post on social media.    Follow up requires close monitoring in this time sensitive window of opportunity to establish milk supply and facilitate the learning of  breastfeeding.    Mom is encouraged to e-mail to update how the plan is working.    Pediatrician appointment: 2 week well child check    Follow-up for infant weight check and dyad breastfeeding evaluation in 8 day(s) or as needed  Please call [phone] if you have not scheduled your next appointment    A total of 70 minutes, not including infant assessment time, with more than 50% was spent preparing to see the patient, obtaining and reviewing separately obtained history, performing a medically appropriate examination and evaluation, counseling and educating the family, independently interpreting weighted feeds and infant growth results, communicating these results to the family and care coordination as detailed in the above note.       PATRICIA Hays.

## 2021-12-14 ENCOUNTER — PATIENT OUTREACH (OUTPATIENT)
Dept: HEALTH INFORMATION MANAGEMENT | Facility: OTHER | Age: 21
End: 2021-12-14

## 2022-01-03 NOTE — DISCHARGE SUMMARY
DATE OF ADMISSION:  12/07/2021   DATE OF DISCHARGE:  12/10/2021     DIAGNOSIS ON ADMISSION: Term intrauterine pregnancy at 38 weeks with active   labor.     DIAGNOSIS ON DISCHARGE:  Status post normal spontaneous vaginal delivery.     HOSPITAL COURSE:  The patient with prenatal care in my office, which was   essentially uncomplicated.  She was admitted by my on-call partner, Dr. Elenita Pineda, with active labor.  Cervix 6 cm dilated.  She progressed well to a   spontaneous vaginal delivery on the morning of 12/08/2021, delivering a viable   male infant with Apgars of 8 and 9.  Her postpartum course was uncomplicated.    On the day of discharge, which is postpartum day #2, she is tolerating   regular diet, ambulating, voiding, passing flatus, reporting adequate pain   control and breastfeeding without difficulty.  She has been completely   afebrile.  Vital signs stable, within normal limits.  Postpartum H and H is 11   and 33.  Her lungs were clear.  Heart is regular rate and rhythm.  Abdomen is   soft.  Abdomen is soft.  Fundus is firm and below the umbilicus.  She was   kept until postpartum day #2 secondary to positive GBS culture.  On the   morning of discharge, she is discharged home in stable condition with the   following instructions:  Follow up in my office in 6 weeks.  Pelvic rest x6   weeks.  Continue prenatal vitamins while breastfeeding.  Ibuprofen   prescription written.  All postpartum complications, concerns and warning   signs discussed.  All questions answered.  She and her  are comfortable   with discharge home with their baby at this time.        ______________________________  MD HIMA Ortiz/HALEIGH/SAIDA    DD:  01/02/2022 11:16  DT:  01/02/2022 16:12    Job#:  [id number]

## 2022-01-19 ENCOUNTER — POST PARTUM (OUTPATIENT)
Dept: OBGYN | Facility: CLINIC | Age: 22
End: 2022-01-19
Payer: COMMERCIAL

## 2022-01-19 VITALS — DIASTOLIC BLOOD PRESSURE: 82 MMHG | BODY MASS INDEX: 26.46 KG/M2 | SYSTOLIC BLOOD PRESSURE: 140 MMHG | WEIGHT: 174 LBS

## 2022-01-19 DIAGNOSIS — Z30.09 GENERAL COUNSELING AND ADVICE FOR CONTRACEPTIVE MANAGEMENT: ICD-10-CM

## 2022-01-19 PROCEDURE — 0503F POSTPARTUM CARE VISIT: CPT | Performed by: OBSTETRICS & GYNECOLOGY

## 2022-01-19 RX ORDER — ETONOGESTREL AND ETHINYL ESTRADIOL VAGINAL RING .015; .12 MG/D; MG/D
RING VAGINAL
Qty: 1 EACH | Refills: 11 | Status: SHIPPED | OUTPATIENT
Start: 2022-01-19

## 2022-01-19 RX ORDER — BUPROPION HYDROCHLORIDE 150 MG/1
150 TABLET ORAL EVERY MORNING
Qty: 30 TABLET | Refills: 5 | Status: SHIPPED | OUTPATIENT
Start: 2022-01-19

## 2022-01-19 ASSESSMENT — FIBROSIS 4 INDEX: FIB4 SCORE: 0.27

## 2022-01-19 NOTE — PROGRESS NOTES
Post-Partum Visit    CC: post-partum    HPI: 21 y.o.  s/p vaginal, spontaneous on 21 @ 38w2d w/ Dr Pineda for Dr Suggs.  No complications around delivery.   Prenatal course uncomplicated.    Pt reports breastfeeding is going well. She actually states she makes too much breast milk and would like to decrease.   BFing: yes    No menses, has not had intercourse      EDPDS: 11  She denies SI/HI  She has a history of depression and has been on several medications in the past. She has been on Wellbutrin previously and done well and would like to restart. She has a therapist through [facility] and she will call them today to get scheduled/re-established.     Last pap: 2021- normal    ROS:  gen: denies general concerns, fevers  Breast: denies pain, redness, concerns  abd: denies abd pain, GI concerns  : denies vaginal bleeding, discharge, pain    Past Medical History:   Diagnosis Date   • Allergy    • Asthma    • Psychiatric disorder     depression, bipolar       Physical Exam:  LMP 2020   gen: AAO, NAD  Breasts: symmetric, no masses, nontender, no skin changes  abd: soft, NT, ND, no masses  : NEFG, normal vagina and cervix,mediolateral episiotomy well healed   Uterus small, nontender, anteverted, no adnexal masses/tenderness   Ext: NT, no edema    A/P: 21 y.o.  s/p vaginal, spontaneous  - pap up to date  - BFing, encouraged PNV use, lactation if needed  - postpartum depression- will start wellbutrin and re-establish with [facility] therapy. She denies SI/HI or thoughts of harming herself or baby.   - contraception: desires to return to using nuvaring. She has done well on this in the past. She verbalizes an undrstanding that this may decrease breast milk production slightly. She is planning to stop breast feeding soon.  All ?S answered.     RTC annually/PRN

## 2022-02-28 NOTE — DISCHARGE INSTRUCTIONS
PATIENT DISCHARGE EDUCATION INSTRUCTION SHEET  REASONS TO CALL YOUR OBSTETRICIAN  · Persistent fever, shaking, chills (Temperature higher than 100.4) may indicate you have an infection  · Heavy bleeding: soaking more than 1 pad per hour; Passing clots an egg-sized clot or bigger may mean you have an postpartum hemorrhage  · Foul odor from vagina or bad smelling or discolored discharge or blood  · Breast infection (Mastitis symptoms); breast pain, chills, fever, redness or red streaks, may feel flu like symptoms  · Urinary pain, burning or frequency  · Incision that is not healing, increased redness, swelling, tenderness or pain, or any pus from episiotomy or  site may mean you have an infection  · Redness, swelling, warmth, or painful to touch in the calf area of your leg may mean you have a blood clot  · Severe or intensified depression, thoughts or feelings of wanting to hurt yourself or someone else   · Pain in chest, obstructed breathing or shortness of breath (trouble catching your breath) may mean you are having a postpartum complication. Call your provider immediately   · Headache that does not get better, even after taking medicine, a bad headache with vision changes or pain in the upper right area of your belly may mean you have high blood pressure or post birth preeclampsia. Call your provider immediately    HAND WASHING  All family and friends should wash their hands:  · Before and after holding the baby  · Before feeding the baby  · After using the restroom or changing the baby's diaper    WOUND CARE  Ask your physician for additional care instructions. In general:  ·  Incision:  · May shower and pat incision dry   · Keep the incision clean and dry  · There should not be any opening or pus from the incision  · Continue to walk at home 3 times a day   · Do NOT lift anything heavier than your baby (over 10 pounds)  · Encourage family to help participate in care of the  to allow  rest and mom time to heal  · Episiotomy/Laceration  · May use jing-spray bottle, witch hazel pads and dermaplast spray for comfort  · Use jing-spray bottle after urinating to cleanse perineal area  · To prevent burning during urination spray jing-water bottle on labial area   · Pat perineal area dry until episiotomy/laceration is healed  · Continue to use jing-bottle until bleeding stops as needed  · If have a 2nd degree laceration or greater, a Sitz bath can offer relief from soreness, burning, and inflammation   · Sitz Bath   · Sit in 6 inches of warm water and soak laceration as needed until the laceration heals    VAGINAL CARE AND BLEEDING  · Nothing inside vagina for 6 weeks:   · No sexual intercourse, tampons or douching  · Bleeding may continue for 2-4 weeks. Amount and color may vary  · Soaking 1 pad or more in an hour for several hours is considered heavy bleeding  · Passing large egg sized blood clots can be concerning  · If you feel like you have heavy bleeding or are having increasing amount of blood clots call your Obstetrician immediately  · If you begin feeling faint upon standing, feeling sick to your stomach, have clammy skin, a really fast heartbeat, have chills, start feeling confused, dizzy, sleepy or weak, or feeling like you're going to faint call your Obstetrician immediately    HYPERTENSION   Preeclampsia or gestational hypertension are types of high blood pressure that only pregnant women can get. It is important for you to be aware of symptoms to seek early intervention and treatment. If you have any of these symptoms immediately call your Obstetrician    · Vision changes or blurred vision   · Severe headache or pain that is unrelieved with medication and will not go away  · Persistent pain in upper abdomen or shoulder   · Increased swelling of face, feet, or hands  · Difficulty breathing or shortness of breath at rest  · Urinating less than usual    URINATION AND BOWEL MOVEMENTS  · Eating  "more fiber (bran cereal, fruits, and vegetables) and drinking plenty of fluids will help to avoid constipation  · Urinary frequency and urgency after childbirth is normal  · If you experience any urinary pain, burning or frequency call your provider    BIRTH CONTROL  · It is possible to become pregnant at any time after delivery and while breastfeeding  · Plan to discuss a method of birth control with your physician at your post delivery follow up visit    POSTPARTUM BLUES  During the first few days after birth, you may experience a sense of the \"blues\" which may include impatience, irritability or even crying. These feelings come and go quickly. However, as many as 1 in 10 women experience emotional symptoms known as postpartum depression.     POSTPARTUM DEPRESSION    May start as early as the second or third day after delivery or take several weeks or months to develop. Symptoms of \"blues\" are present, but are more intense: Crying spells; loss of appetite; feelings of hopelessness or loss of control; fear of touching the baby; over concern or no concern at all about the baby; little or no concern about your own appearance/caring for yourself; and/or inability to sleep or excessive sleeping. Contact your Obstetrician if you are experiencing any of these symptoms     PREVENTING SHAKEN BABY  If you are angry or stressed, PUT THE BABY IN THE CRIB, step away, take some deep breaths, and wait until you are calm to care for the baby. DO NOT SHAKE THE BABY. You are not alone, call a supporter for help.  · Crisis [facility] 24/7 crisis call line (609-369-6971) or (1-503.493.5675)  · You can also text them, text \"ANSWER\" (963056)      " High Dose Vitamin A Pregnancy And Lactation Text: High dose vitamin A therapy is contraindicated during pregnancy and breast feeding.

## 2022-06-27 ENCOUNTER — HOSPITAL ENCOUNTER (EMERGENCY)
Facility: MEDICAL CENTER | Age: 22
End: 2022-06-27
Attending: EMERGENCY MEDICINE
Payer: COMMERCIAL

## 2022-06-27 ENCOUNTER — APPOINTMENT (OUTPATIENT)
Dept: RADIOLOGY | Facility: MEDICAL CENTER | Age: 22
End: 2022-06-27
Payer: COMMERCIAL

## 2022-06-27 ENCOUNTER — APPOINTMENT (OUTPATIENT)
Dept: RADIOLOGY | Facility: MEDICAL CENTER | Age: 22
End: 2022-06-27
Attending: EMERGENCY MEDICINE
Payer: COMMERCIAL

## 2022-06-27 VITALS
TEMPERATURE: 98.1 F | WEIGHT: 157.63 LBS | HEIGHT: 68 IN | RESPIRATION RATE: 18 BRPM | HEART RATE: 77 BPM | OXYGEN SATURATION: 98 % | BODY MASS INDEX: 23.89 KG/M2 | DIASTOLIC BLOOD PRESSURE: 80 MMHG | SYSTOLIC BLOOD PRESSURE: 110 MMHG

## 2022-06-27 DIAGNOSIS — O20.0 THREATENED MISCARRIAGE: ICD-10-CM

## 2022-06-27 DIAGNOSIS — R82.71 ASYMPTOMATIC BACTERIURIA: ICD-10-CM

## 2022-06-27 DIAGNOSIS — O41.8X10 SUBCHORIONIC HEMATOMA IN FIRST TRIMESTER, SINGLE OR UNSPECIFIED FETUS: ICD-10-CM

## 2022-06-27 DIAGNOSIS — O46.8X1 SUBCHORIONIC HEMATOMA IN FIRST TRIMESTER, SINGLE OR UNSPECIFIED FETUS: ICD-10-CM

## 2022-06-27 LAB
ALBUMIN SERPL BCP-MCNC: 4.4 G/DL (ref 3.2–4.9)
ALBUMIN/GLOB SERPL: 1.5 G/DL
ALP SERPL-CCNC: 71 U/L (ref 30–99)
ALT SERPL-CCNC: 7 U/L (ref 2–50)
ANION GAP SERPL CALC-SCNC: 14 MMOL/L (ref 7–16)
APPEARANCE UR: CLEAR
AST SERPL-CCNC: 9 U/L (ref 12–45)
B-HCG SERPL-ACNC: 4027 MIU/ML (ref 0–10)
BACTERIA #/AREA URNS HPF: ABNORMAL /HPF
BASOPHILS # BLD AUTO: 0.3 % (ref 0–1.8)
BASOPHILS # BLD: 0.03 K/UL (ref 0–0.12)
BILIRUB SERPL-MCNC: 1.3 MG/DL (ref 0.1–1.5)
BILIRUB UR QL STRIP.AUTO: NEGATIVE
BUN SERPL-MCNC: 9 MG/DL (ref 8–22)
CALCIUM SERPL-MCNC: 9.2 MG/DL (ref 8.4–10.2)
CHLORIDE SERPL-SCNC: 102 MMOL/L (ref 96–112)
CO2 SERPL-SCNC: 19 MMOL/L (ref 20–33)
COLOR UR: YELLOW
CREAT SERPL-MCNC: 0.59 MG/DL (ref 0.5–1.4)
EOSINOPHIL # BLD AUTO: 0.04 K/UL (ref 0–0.51)
EOSINOPHIL NFR BLD: 0.4 % (ref 0–6.9)
EPI CELLS #/AREA URNS HPF: ABNORMAL /HPF
ERYTHROCYTE [DISTWIDTH] IN BLOOD BY AUTOMATED COUNT: 37.7 FL (ref 35.9–50)
GFR SERPLBLD CREATININE-BSD FMLA CKD-EPI: 130 ML/MIN/1.73 M 2
GLOBULIN SER CALC-MCNC: 2.9 G/DL (ref 1.9–3.5)
GLUCOSE SERPL-MCNC: 101 MG/DL (ref 65–99)
GLUCOSE UR STRIP.AUTO-MCNC: NEGATIVE MG/DL
HCT VFR BLD AUTO: 40.3 % (ref 37–47)
HGB BLD-MCNC: 13.7 G/DL (ref 12–16)
IMM GRANULOCYTES # BLD AUTO: 0.03 K/UL (ref 0–0.11)
IMM GRANULOCYTES NFR BLD AUTO: 0.3 % (ref 0–0.9)
KETONES UR STRIP.AUTO-MCNC: 15 MG/DL
LEUKOCYTE ESTERASE UR QL STRIP.AUTO: ABNORMAL
LIPASE SERPL-CCNC: 29 U/L (ref 7–58)
LYMPHOCYTES # BLD AUTO: 2.53 K/UL (ref 1–4.8)
LYMPHOCYTES NFR BLD: 28.3 % (ref 22–41)
MCH RBC QN AUTO: 28.7 PG (ref 27–33)
MCHC RBC AUTO-ENTMCNC: 34 G/DL (ref 33.6–35)
MCV RBC AUTO: 84.5 FL (ref 81.4–97.8)
MICRO URNS: ABNORMAL
MONOCYTES # BLD AUTO: 0.56 K/UL (ref 0–0.85)
MONOCYTES NFR BLD AUTO: 6.3 % (ref 0–13.4)
MUCOUS THREADS #/AREA URNS HPF: ABNORMAL /HPF
NEUTROPHILS # BLD AUTO: 5.74 K/UL (ref 2–7.15)
NEUTROPHILS NFR BLD: 64.4 % (ref 44–72)
NITRITE UR QL STRIP.AUTO: NEGATIVE
NRBC # BLD AUTO: 0 K/UL
NRBC BLD-RTO: 0 /100 WBC
PH UR STRIP.AUTO: 5.5 [PH] (ref 5–8)
PLATELET # BLD AUTO: 427 K/UL (ref 164–446)
PMV BLD AUTO: 9.3 FL (ref 9–12.9)
POTASSIUM SERPL-SCNC: 3.3 MMOL/L (ref 3.6–5.5)
PROT SERPL-MCNC: 7.3 G/DL (ref 6–8.2)
PROT UR QL STRIP: NEGATIVE MG/DL
RBC # BLD AUTO: 4.77 M/UL (ref 4.2–5.4)
RBC # URNS HPF: ABNORMAL /HPF
RBC UR QL AUTO: NEGATIVE
SODIUM SERPL-SCNC: 135 MMOL/L (ref 135–145)
SP GR UR STRIP.AUTO: >=1.03
WBC # BLD AUTO: 8.9 K/UL (ref 4.8–10.8)
WBC #/AREA URNS HPF: ABNORMAL /HPF

## 2022-06-27 PROCEDURE — 76801 OB US < 14 WKS SINGLE FETUS: CPT

## 2022-06-27 PROCEDURE — 81001 URINALYSIS AUTO W/SCOPE: CPT

## 2022-06-27 PROCEDURE — 80053 COMPREHEN METABOLIC PANEL: CPT

## 2022-06-27 PROCEDURE — 36415 COLL VENOUS BLD VENIPUNCTURE: CPT

## 2022-06-27 PROCEDURE — 85025 COMPLETE CBC W/AUTO DIFF WBC: CPT

## 2022-06-27 PROCEDURE — 84702 CHORIONIC GONADOTROPIN TEST: CPT

## 2022-06-27 PROCEDURE — 83690 ASSAY OF LIPASE: CPT

## 2022-06-27 PROCEDURE — 99284 EMERGENCY DEPT VISIT MOD MDM: CPT

## 2022-06-27 RX ORDER — NITROFURANTOIN 25; 75 MG/1; MG/1
100 CAPSULE ORAL 2 TIMES DAILY
Qty: 10 CAPSULE | Refills: 0 | Status: SHIPPED | OUTPATIENT
Start: 2022-06-27 | End: 2022-07-02

## 2022-06-28 NOTE — ED PROVIDER NOTES
ER Provider Note     Scribed for Gerardo Woods M.D. by Jeff Yost. 6/27/2022, 9:10 PM.    Primary Care Provider: No primary care provider noted.  Means of Arrival: Walk-in   History obtained from: Patient  History limited by: None     CHIEF COMPLAINT  Chief Complaint   Patient presents with   • Cramping   • Pregnancy     States she is approx 6 weeks pregnant and has been having cramping on the left side about 2 nights ago. Denies any vaginal bleeding. States this has occurred when she miscarried in the past.    G3 A1 P1       HPI  Chyna Wade is a 21 y.o. female who presents to the Emergency Department for acute, mild abdominal cramping onset two days ago. Per patient, she is currently 6 weeks pregnant and has been experiencing left sided abdominal cramping. She has a history of miscarriage and says her symptoms today are similar to her miscarriage. Denies vaginal bleeding. No alleviating or exacerbating factors reported.    REVIEW OF SYSTEMS  See HPI for further details. All other systems are negative.     PAST MEDICAL HISTORY   has a past medical history of Allergy, Asthma, and Psychiatric disorder.    SURGICAL HISTORY   has a past surgical history that includes myringotomy.    SOCIAL HISTORY  Social History     Tobacco Use   • Smoking status: Never Smoker   • Smokeless tobacco: Never Used   Vaping Use   • Vaping Use: Never used   Substance Use Topics   • Alcohol use: Not Currently   • Drug use: No      Social History     Substance and Sexual Activity   Drug Use No       FAMILY HISTORY  History reviewed. No pertinent family history.    CURRENT MEDICATIONS  Home Medications     Reviewed by Kacie Juarez R.N. (Registered Nurse) on 06/27/22 at [facility]  Med List Status: Not Addressed   Medication Last Dose Status   acyclovir (ZOVIRAX) 400 MG tablet  Active   albuterol 108 (90 Base) MCG/ACT Aero Soln inhalation aerosol  Active   buPROPion (WELLBUTRIN XL) 150 MG XL tablet  Active   ethinyl  "estradiol-etonogestrel (NUVARING) 0.12-0.015 MG/24HR vaginal ring  Active   ibuprofen (MOTRIN) 600 MG Tab  Active   Misc. Devices (BREAST PUMP) Misc  Active   Prenatal MV-Min-Fe Fum-FA-DHA (PRENATAL 1 PO)  Active                ALLERGIES  Allergies   Allergen Reactions   • Nitroglycerin      Heart stops     • Sulfa Drugs Diarrhea and Nausea     Sick for weeks       PHYSICAL EXAM  VITAL SIGNS: /87   Pulse (!) 109   Temp 37.4 °C (99.3 °F) (Temporal)   Resp 20   Ht 1.727 m (5' 8\")   Wt 71.5 kg (157 lb 10.1 oz)   SpO2 97%   BMI 23.97 kg/m²      Constitutional: Alert in no apparent distress.  HENT: No signs of trauma, Bilateral external ears normal, Nose normal.   Eyes: Pupils are equal and reactive, Conjunctiva normal, Non-icteric.   Neck: Normal range of motion, No tenderness, Supple, No stridor.   Lymphatic: No lymphadenopathy noted.   Cardiovascular: Regular rate and rhythm, no palpable thrill  Thorax & Lungs: No respiratory distress,  No chest tenderness.  CTAB  Abdomen: Bowel sounds normal, Soft, Mild left lower quadrant pain, No masses, No pulsatile masses. No peritoneal signs.  Skin: Warm, Dry, No erythema, No rash.   Back: No bony tenderness, No CVA tenderness.   Extremities: Intact distal pulses, No edema, No tenderness, No cyanosis.  Musculoskeletal: Good range of motion in all major joints. No tenderness to palpation or major deformities noted.   Neurologic: Alert , Normal motor function, Normal sensory function, No focal deficits noted.  Psychiatric: Affect normal, Judgment normal, Mood normal.    DIAGNOSTIC STUDIES / PROCEDURES    LABS  Labs Reviewed   COMP METABOLIC PANEL - Abnormal; Notable for the following components:       Result Value    Potassium 3.3 (*)     Co2 19 (*)     Glucose 101 (*)     AST(SGOT) 9 (*)     All other components within normal limits   HCG QUANTITATIVE - Abnormal; Notable for the following components:    Bhcg 4027.0 (*)     All other components within normal limits "   URINALYSIS,CULTURE IF INDICATED - Abnormal; Notable for the following components:    Ketones 15 (*)     Leukocyte Esterase Small (*)     All other components within normal limits    Narrative:     Indication for culture:->Pregnant women: fever and/or  asymptomatic screening   URINE MICROSCOPIC (W/UA) - Abnormal; Notable for the following components:    WBC 5-10 (*)     Bacteria Few (*)     All other components within normal limits    Narrative:     Indication for culture:->Pregnant women: fever and/or  asymptomatic screening   CBC WITH DIFFERENTIAL   LIPASE   ESTIMATED GFR     All labs reviewed by me.    RADIOLOGY  US-OB 1ST TRIMESTER WITH TRANSVAGINAL (COMBO)   Final Result         1.  Single Intrauterine gestational sac at 6 weeks 0 day estimated gestational age.   2.  Subchorionic hemorrhage   3.  Anechoic lesion in the right ovary, appearance suggests corpus luteal cyst         The radiologist's interpretation of all radiological studies have been reviewed by me.    COURSE & MEDICAL DECISION MAKING  Pertinent Labs & Imaging studies reviewed. (See chart for details)    This is a 21 y.o. female that presents with some cramping and having a positive pregnancy test.  At this time we evaluate the patient for.     9:10 PM - Patient seen and examined at bedside. Discussed plan of care, including labs and radiology for further evaluation. Patient agrees to the plan of care. Ordered US-OB Transvaginal, Urine Microscopic w/ UA, CBC w/ Diff, CMP, Lipase, HCG Quantitative, and UA Culture if indicated.    10:16 PM - Patient was reevaluated at bedside. I informed her she has an intrauterine pregnancy and subchorionic at this time. I reassured her there is no reason for concern at this time. I encouraged she follow up to an OBGYN for further evaluation. Discussed plans and precautions for discharge. Patient agrees to the plan of discharge.    The patient will return for new or worsening symptoms and is stable at the time of  discharge.    Patient has a subchorionic hemorrhage.  The patient has a IUP.  The patient was given Macrobid for asymptomatic bacteriuria.  Patient will be following up with the pregnancy center.    DISPOSITION:  Patient will be discharged home in stable condition.    FOLLOW UP:  [facility]  [address] [place] [address]  [place] [id number]  [phone]    In 2 days      FINAL IMPRESSION  1. Subchorionic hematoma in first trimester, single or unspecified fetus    2. Threatened miscarriage    3. Asymptomatic bacteriuria       Jeff WILKINSON (Scribe), am scribing for, and in the presence of, Gerardo Woods M.D..    Electronically signed by: Jeff Yost (Scribe), 6/27/2022    Gerardo WILKINSON M.D. personally performed the services described in this documentation, as scribed by Jeff Yost in my presence, and it is both accurate and complete.     The note accurately reflects work and decisions made by me.  Gerardo Woods M.D.  6/27/2022  11:21 PM

## 2022-06-28 NOTE — ED NOTES
Discharge home with instructions and follow up care reviewed and given to patient with verbal understanding.    Family member with patient.

## 2023-12-05 NOTE — DISCHARGE PLANNING
Meds-to-Beds: Discharge prescription order listed below delivered to patient's bedside. RN Shelby notified. Patient counseled. Patient elected to have co-payment billed to patient account.      Current Outpatient Medications   Medication Sig Dispense Refill   • ibuprofen (MOTRIN) 600 MG Tab Take 1 Tablet by mouth every 6 hours as needed (For cramping after delivery) 30 Tablet 1      Jeannie Velazco, PharmD     This note was copied from a baby's chart.  Assisted family with feedings at 0945 and 1120. Infant awake once placed skin to skin. Infant positioned at breast, infant reluctant to open mouth wide enough to latch. Offered gloved finger for infant to suck on. Infant has tight jaw; but able to obtain coordinated suck with encouragement. Attempted to latch infant with nipple shield, at 0945 feeding infant wouldn't open enough to latch. At 1120 feeding, infant did latch briefly on shield and transferred 2 ml formula at breast. Infant then stopped sucking and fell asleep. Encouraged unlimited skin to skin. Discussed feeding cues and encouraged parents to offer breast at any feeding cue. Continue monitoring and assist as needed.